# Patient Record
Sex: MALE | Race: WHITE | NOT HISPANIC OR LATINO | Employment: FULL TIME | URBAN - METROPOLITAN AREA
[De-identification: names, ages, dates, MRNs, and addresses within clinical notes are randomized per-mention and may not be internally consistent; named-entity substitution may affect disease eponyms.]

---

## 2021-03-02 ENCOUNTER — IMMUNIZATION (OUTPATIENT)
Dept: VACCINE CLINIC | Facility: HOSPITAL | Age: 61
End: 2021-03-02

## 2021-03-02 PROCEDURE — 91300 HC SARSCOV02 VAC 30MCG/0.3ML IM: CPT | Performed by: INTERNAL MEDICINE

## 2021-03-02 PROCEDURE — 0001A: CPT | Performed by: INTERNAL MEDICINE

## 2021-03-23 ENCOUNTER — IMMUNIZATION (OUTPATIENT)
Dept: VACCINE CLINIC | Facility: HOSPITAL | Age: 61
End: 2021-03-23

## 2021-03-23 PROCEDURE — 0002A: CPT | Performed by: INTERNAL MEDICINE

## 2021-03-23 PROCEDURE — 91300 HC SARSCOV02 VAC 30MCG/0.3ML IM: CPT | Performed by: INTERNAL MEDICINE

## 2021-10-22 RX ORDER — IBUPROFEN 200 MG
200 TABLET ORAL EVERY 6 HOURS PRN
Status: ON HOLD | COMMUNITY
End: 2021-10-29 | Stop reason: HOSPADM

## 2021-10-22 RX ORDER — BIOTIN 5 MG
TABLET ORAL
COMMUNITY

## 2021-10-22 RX ORDER — ATENOLOL AND CHLORTHALIDONE TABLET 100; 25 MG/1; MG/1
1 TABLET ORAL DAILY
COMMUNITY

## 2021-10-22 NOTE — PROGRESS NOTES
Place patient label inside box (if no patient label, complete below)  Name:  :  MR#:    High / PROCEDURE  1. I (we), Jason Benson (Patient Name) authorize Ralph Villareal (Provider / Jeanette Leigh) and/or such assistants as may be selected by him/her, to perform the following operation/procedure(s): RIGHT: SUBTALAR JOINT ARTHRODESIS, TALONAVICULAR JOINT ARTHRODESIS, POSSIBLE TRIPLE ARTHRODESIS, GASTROCNEMIUS RECESSION       Note: If unable to obtain consent prior to an emergent procedure, document the emergent reason in the medical record. This procedure has been explained to my (our) satisfaction and included in the explanation was:  A) The intended benefit, nature, and extent of the procedure to be performed;  B) The significant risks involved and the probability of success;  C) Alternative procedures and methods of treatment;  D) The dangers and probable consequences of such alternatives (including no procedure or treatment); E) The expected consequences of the procedure on my future health;  F) Whether other qualified individuals would be performing important surgical tasks and/or whether  would be present to advise or support the procedure. I (we) understand that there are other risks of infection and other serious complications in the pre-operative/procedural and postoperative/procedural stages of my (our) care. I (we) have asked all of the questions which I (we) thought were important in deciding whether or not to undergo treatment or diagnosis. These questions have been answered to my (our) satisfaction. I (we) understand that no assurance can be given that the procedure will be a success, and no guarantee or warranty of success has been given to me (us).     2. It has been explained to me (us) that during the course of the operation/procedure, unforeseen conditions may be revealed that necessitate extension of the original procedure(s) or different procedure(s) than those set forth in Paragraph 1. I (we) authorize and request that the above-named physician, his/her assistants or his/her designees, perform procedures as necessary and desirable if deemed to be in my (our) best interest.     Revised 8/2/2021                                                                          Page 1 of 2             3. I acknowledge that health care personnel may be observing this procedure for the purpose of medical education or other specified purposes as may be necessary as requested and/or approved by my (our) physician. 4. I (we) consent to the disposal by the hospital Pathologist of the removed tissue, parts or organs in accordance with hospital policy. 5. I do ____ do not ____ consent to the use of a local infiltration pain blocking agent that will be used by my provider/surgical provider to help alleviate pain during my procedure. 6. I do ____ do not ____ consent to an emergent blood transfusion in the case of a life-threatening situation that requires blood components to be administered. This consent is valid for 24 hours from the beginning of the procedure. 7. This patient does ____ or does not ____ currently have a DNR status/order. If DNR order is in place, obtain Addendum to the Surgical Consent for ALL Patients with a DNR Order to address josseline-operative status for limited intervention or DNR suspension.    8. I have read and fully understand the above Consent for Operation/Procedure and that all blanks were completed before I signed the consent.   _____________________________       _____________________      ____/____am/pm  Signature of Patient or legal representative      Printed Name / Relationship            Date / Time   ____________________________       _____________________      ____/____am/pm  Witness to Signature                                    Printed Name                    Date / Time     If patient is unable to sign or is a minor, complete the following)  Patient is a minor, ____ years of age, or unable to sign because:   ______________________________________________________________________________________________    Kristy Alfonso If a phone consent is obtained, consent will be documented by using two health care professionals, each affirming that the consenting party has no questions and gives consent for the procedure discussed with the physician/provider.   _____________________          ____________________       _____/_____am/pm   2nd witness to phone consent        Printed name           Date / Time    Informed Consent:  I have provided the explanation described above in section 1 to the patient and/or legal representative.  I have provided the patient and/or legal representative with an opportunity to ask any questions about the proposed operation/procedure.   ___________________________          ____________________         ____/____am/pm  Provider / Proceduralist                            Printed name            Date / Time  Revised 8/2/2021                                                                      Page 2 of 2

## 2021-10-22 NOTE — PROGRESS NOTES
[facility] patients having surgery or anesthesia are required to be Covid tested OR to have been vaccinated at least 14 days prior to your procedure. It is very important to return our call to [phone] and notify the staff of your last vaccination date otherwise you will be required to complete Covid PCR test within the 5-6 days prior to surgery & quarantine. The results will need to be faxed to PreAdmission Testing at [phone]. PRIOR TO PROCEDURE DATE:        1. PLEASE FOLLOW ANY  GUIDELINES/ INSTRUCTIONS PRIOR TO YOUR PROCEDURE AS ADVISED BY YOUR SURGEON. 2. Arrange for someone to drive you home and be with you for the first 24 hours after discharge for your safety after your procedure for which you received sedation. Ensure it is someone we can share information with regarding your discharge. 3. You must contact your surgeon for instructions IF:   You are taking any blood thinners, aspirin, anti-inflammatory or vitamin E.   There is a change in your physical condition such as a cold, fever, rash, cuts, sores or any other infection, especially near your surgical site. 4. Do not drink alcohol the day before or day of your procedure. 5. A Pre-op History and Physical for surgery MUST be completed by your Physician or Urgent Care within 30 days of your procedure date. Please bring a copy with you on the day of your procedure and along with any other testing performed. THE DAY OF YOUR PROCEDURE:  1. Follow instructions for ARRIVAL TIME as DIRECTED BY YOUR SURGEON. 2. Enter the MAIN entrance from [employer] and follow the signs to the free [employer] or [employer] parking (offered free of charge 6am-5pm). 3. Enter the Main Entrance of the hospital (do not enter from the lower level of the parking garage). Upon entrance, check in with the  at the main desk on your left. If no one is available at the desk, proceed into the [facility] Waiting Room and go through the door directly into the [facility]. There is a Check-in desk ACROSS from Room 5 (marked with a sign hanging from the ceiling). The phone number for the surgery center is [phone]. 4. Please call [phone] option #2 option #2 if you have not been preregistered yet. On the day of your procedure bring your insurance card and photo ID. You will be registered at your bedside once brought back to your room. 5. DO NOT EAT ANYTHING eight hours prior to your arrival for surgery. May have 8 ounces of water 4 hours prior to your arrival for surgery. NOTE: ALL Gastric, Bariatric and Bowel surgery patients MUST follow their surgeon's instructions. 6. MEDICATIONS    Take the following medications with a SMALL sip of water: NONE   Bariatric patient's call surgeon if on diabetic medications as some need to be stopped 1 week preop   Use your usual dose of inhalers the morning of surgery. BRING your rescue inhaler with you to hospital.    Anesthesia does NOT want you to take insulin the morning of surgery. They will control your blood sugar while you are at the hospital. Please contact your ordering physician for instructions regarding your insulin the night before your procedure. If you have an insulin pump, please keep it set on basal rate. 7. Do not swallow water when brushing teeth. No gum, candy, mints or ice chips. Refrain from smoking or at least decrease the amount. 8. Dress in loose, comfortable clothing appropriate for redressing after your procedure. Do not wear jewelry (including body piercings), make-up (especially NO eye make-up), fingernail polish (NO toenail polish if foot/leg surgery), lotion, powders or metal hairclips. 9. Dentures, glasses, or contacts will need to be removed before your procedure.  Bring cases for your glasses, contacts, dentures, or hearing aids to protect them while you are in surgery. 10. If you use a CPAP, please bring it with you on the day of your procedure. 11. We recommend that valuable personal  belongings such as cash, cell phones, e-tablets or jewelry, be left at home during your stay. The hospital will not be responsible for valuables that are not secured in the hospital safe. However, if your insurance requires a co-pay, you may want to bring a method of payment, i.e. Check or credit card, if you wish to pay your co-pay the day of surgery. 12. If you are to stay overnight, you may bring a bag with personal items. Please have any large items you may need brought in by your family after your arrival to your hospital room. 15. If you have a Living Will or Durable Power of , please bring a copy on the day of your procedure. 15. With your permission, one family member may accompany you while you are being prepared for surgery. Once you are ready, additional family members may join you. HOW WE KEEP YOU SAFE and WORK TO PREVENT SURGICAL SITE INFECTIONS:  1. Health care workers should always check your ID bracelet to verify your name and birth date. You will be asked many times to state your name, date of birth, and allergies. 2. Health care workers should always clean their hands with soap or alcohol gel before providing care to you. It is okay to ask anyone if they cleaned their hands before they touch you. 3. You will be actively involved in verifying the type of procedure you are having and ensuring the correct surgical site. This will be confirmed multiple times prior to your procedure. Do NOT yoon your surgery site UNLESS instructed to by your surgeon. 4. Do not shave or wax for 72 hours prior to procedure near your operative site. Shaving with a razor can irritate your skin and make it easier to develop an infection.  On the day of your procedure, any hair that needs to be removed near the surgical site will be clipped by a healthcare worker using a special clippers designed to avoid skin irritation. 5. When you are in the operating room, your surgical site will be cleansed with a special soap, and in most cases, you will be given an antibiotic before the surgery begins. What to expect AFTER YOUR PROCEDURE:  1. Immediately following your procedure, your will be taken to the PACU for the first phase of your recovery. Your nurse will help you recover from any potential side effects of anesthesia, such as extreme drowsiness, changes in your vital signs or breathing patterns. Nausea, headache, muscle aches, or sore throat may also occur after anesthesia. Your nurse will help you manage these potential side effects. 2. For comfort and safety, arrange to have someone at home with you for the first 24 hours after discharge. 3. You and your family will be given written instructions about your diet, activity, dressing care, medications, and return visits. 4. Once at home, should issues with nausea, pain, or bleeding occur, or should you notice any signs of infection, you should call your surgeon. 5. Always clean your hands before and after caring for your wound. Do not let your family touch your surgery site without cleaning their hands. 6. Narcotic pain medications can cause significant constipation. You may want to add a stool softener to your postoperative medication schedule or speak to your surgeon on how best to manage this SIDE EFFECT. SPECIAL INSTRUCTIONS     Thank you for allowing us to care for you. We strive to exceed your expectations in the delivery of care and service provided to you and your family. If you need to contact the [place] [address] staff for any reason, please call us at [phone]    Instructions reviewed with patient during preadmission testing phone interview.   Romana Luna RN.10/22/2021 .11:46 AM      ADDITIONAL EDUCATIONAL INFORMATION REVIEWED PER PHONE WITH YOU AND/OR YOUR FAMILY:  No Hibiclens® Bathing Instructions   Yes Antibacterial Soap

## 2021-10-27 ENCOUNTER — ANESTHESIA EVENT (OUTPATIENT)
Dept: OPERATING ROOM | Age: 61
End: 2021-10-27
Payer: COMMERCIAL

## 2021-10-28 ENCOUNTER — HOSPITAL ENCOUNTER (OUTPATIENT)
Age: 61
Setting detail: OBSERVATION
Discharge: HOME | End: 2021-10-29
Attending: ORTHOPAEDIC SURGERY | Admitting: ORTHOPAEDIC SURGERY
Payer: COMMERCIAL

## 2021-10-28 ENCOUNTER — ANESTHESIA (OUTPATIENT)
Dept: OPERATING ROOM | Age: 61
End: 2021-10-28
Payer: COMMERCIAL

## 2021-10-28 VITALS — DIASTOLIC BLOOD PRESSURE: 58 MMHG | OXYGEN SATURATION: 94 % | TEMPERATURE: 97.7 F | SYSTOLIC BLOOD PRESSURE: 122 MMHG

## 2021-10-28 DIAGNOSIS — M19.071 ARTHRITIS OF RIGHT FOOT: Primary | ICD-10-CM

## 2021-10-28 LAB
GLUCOSE BLD-MCNC: 146 MG/DL (ref 70–99)
PERFORMED ON: ABNORMAL

## 2021-10-28 PROCEDURE — 3600000004 HC SURGERY LEVEL 4 BASE: Performed by: ORTHOPAEDIC SURGERY

## 2021-10-28 PROCEDURE — 6360000002 HC RX W HCPCS: Performed by: ANESTHESIOLOGY

## 2021-10-28 PROCEDURE — 6360000002 HC RX W HCPCS: Performed by: NURSE ANESTHETIST, CERTIFIED REGISTERED

## 2021-10-28 PROCEDURE — 6370000000 HC RX 637 (ALT 250 FOR IP): Performed by: PHYSICIAN ASSISTANT

## 2021-10-28 PROCEDURE — 6360000002 HC RX W HCPCS: Performed by: ORTHOPAEDIC SURGERY

## 2021-10-28 PROCEDURE — 3600000014 HC SURGERY LEVEL 4 ADDTL 15MIN: Performed by: ORTHOPAEDIC SURGERY

## 2021-10-28 PROCEDURE — 3700000000 HC ANESTHESIA ATTENDED CARE: Performed by: ORTHOPAEDIC SURGERY

## 2021-10-28 PROCEDURE — C1769 GUIDE WIRE: HCPCS | Performed by: ORTHOPAEDIC SURGERY

## 2021-10-28 PROCEDURE — 2580000003 HC RX 258: Performed by: PHYSICIAN ASSISTANT

## 2021-10-28 PROCEDURE — C1713 ANCHOR/SCREW BN/BN,TIS/BN: HCPCS | Performed by: ORTHOPAEDIC SURGERY

## 2021-10-28 PROCEDURE — 3700000001 HC ADD 15 MINUTES (ANESTHESIA): Performed by: ORTHOPAEDIC SURGERY

## 2021-10-28 PROCEDURE — 7100000001 HC PACU RECOVERY - ADDTL 15 MIN: Performed by: ORTHOPAEDIC SURGERY

## 2021-10-28 PROCEDURE — 2580000003 HC RX 258: Performed by: ORTHOPAEDIC SURGERY

## 2021-10-28 PROCEDURE — 64445 NJX AA&/STRD SCIATIC NRV IMG: CPT | Performed by: ANESTHESIOLOGY

## 2021-10-28 PROCEDURE — 2500000003 HC RX 250 WO HCPCS: Performed by: NURSE ANESTHETIST, CERTIFIED REGISTERED

## 2021-10-28 PROCEDURE — 2580000003 HC RX 258: Performed by: STUDENT IN AN ORGANIZED HEALTH CARE EDUCATION/TRAINING PROGRAM

## 2021-10-28 PROCEDURE — C1762 CONN TISS, HUMAN(INC FASCIA): HCPCS | Performed by: ORTHOPAEDIC SURGERY

## 2021-10-28 PROCEDURE — 2720000010 HC SURG SUPPLY STERILE: Performed by: ORTHOPAEDIC SURGERY

## 2021-10-28 PROCEDURE — G0378 HOSPITAL OBSERVATION PER HR: HCPCS

## 2021-10-28 PROCEDURE — 2709999900 HC NON-CHARGEABLE SUPPLY: Performed by: ORTHOPAEDIC SURGERY

## 2021-10-28 PROCEDURE — 64447 NJX AA&/STRD FEMORAL NRV IMG: CPT | Performed by: ANESTHESIOLOGY

## 2021-10-28 PROCEDURE — 2500000003 HC RX 250 WO HCPCS: Performed by: ORTHOPAEDIC SURGERY

## 2021-10-28 PROCEDURE — 7100000000 HC PACU RECOVERY - FIRST 15 MIN: Performed by: ORTHOPAEDIC SURGERY

## 2021-10-28 PROCEDURE — 2500000003 HC RX 250 WO HCPCS: Performed by: ANESTHESIOLOGY

## 2021-10-28 PROCEDURE — 2500000003 HC RX 250 WO HCPCS: Performed by: PHYSICIAN ASSISTANT

## 2021-10-28 DEVICE — LAG SCREW (CANNULATED HEADED) 4.5 X 36 MM
Type: IMPLANTABLE DEVICE | Site: ANKLE | Status: FUNCTIONAL
Brand: IO FIX

## 2021-10-28 DEVICE — FIXATION BEAM, 6.5 X 100 MM
Type: IMPLANTABLE DEVICE | Site: ANKLE | Status: FUNCTIONAL
Brand: AXIS

## 2021-10-28 DEVICE — FIXATION BEAM, 6.5 X 95 MM
Type: IMPLANTABLE DEVICE | Site: ANKLE | Status: FUNCTIONAL
Brand: AXIS

## 2021-10-28 DEVICE — KIT STPL W20XL20MM BNE FIX BRDG 2 LEG CONT COMPR BME ELITE: Type: IMPLANTABLE DEVICE | Site: ANKLE | Status: FUNCTIONAL

## 2021-10-28 DEVICE — BONE GRAFT KIT 7510050 INFUSE XX SMALL
Type: IMPLANTABLE DEVICE | Site: ANKLE | Status: FUNCTIONAL
Brand: INFUSE® BONE GRAFT

## 2021-10-28 DEVICE — LAG SCREW (CANNULATED TAPERED) - 5.0 X 40 MM
Type: IMPLANTABLE DEVICE | Site: ANKLE | Status: FUNCTIONAL
Brand: IO FIX

## 2021-10-28 DEVICE — GRAFT BNE SUB W11-20X14-27XL14-20MM THK24-26MM IL CREST: Type: IMPLANTABLE DEVICE | Site: ANKLE | Status: FUNCTIONAL

## 2021-10-28 DEVICE — SMALL X-POST - 30MM
Type: IMPLANTABLE DEVICE | Site: ANKLE | Status: FUNCTIONAL
Brand: IO FIX

## 2021-10-28 RX ORDER — MORPHINE SULFATE 2 MG/ML
4 INJECTION, SOLUTION INTRAMUSCULAR; INTRAVENOUS
Status: DISCONTINUED | OUTPATIENT
Start: 2021-10-28 | End: 2021-10-29 | Stop reason: HOSPADM

## 2021-10-28 RX ORDER — FENTANYL CITRATE 50 UG/ML
25 INJECTION, SOLUTION INTRAMUSCULAR; INTRAVENOUS EVERY 5 MIN PRN
Status: DISCONTINUED | OUTPATIENT
Start: 2021-10-28 | End: 2021-10-28 | Stop reason: HOSPADM

## 2021-10-28 RX ORDER — SODIUM CHLORIDE, SODIUM LACTATE, POTASSIUM CHLORIDE, CALCIUM CHLORIDE 600; 310; 30; 20 MG/100ML; MG/100ML; MG/100ML; MG/100ML
INJECTION, SOLUTION INTRAVENOUS CONTINUOUS
Status: DISCONTINUED | OUTPATIENT
Start: 2021-10-28 | End: 2021-10-29 | Stop reason: HOSPADM

## 2021-10-28 RX ORDER — SODIUM CHLORIDE 9 MG/ML
25 INJECTION, SOLUTION INTRAVENOUS PRN
Status: DISCONTINUED | OUTPATIENT
Start: 2021-10-28 | End: 2021-10-28 | Stop reason: HOSPADM

## 2021-10-28 RX ORDER — PROCHLORPERAZINE EDISYLATE 5 MG/ML
5 INJECTION INTRAMUSCULAR; INTRAVENOUS
Status: DISCONTINUED | OUTPATIENT
Start: 2021-10-28 | End: 2021-10-28 | Stop reason: HOSPADM

## 2021-10-28 RX ORDER — SUCCINYLCHOLINE/SOD CL,ISO/PF 200MG/10ML
SYRINGE (ML) INTRAVENOUS PRN
Status: DISCONTINUED | OUTPATIENT
Start: 2021-10-28 | End: 2021-10-28 | Stop reason: SDUPTHER

## 2021-10-28 RX ORDER — ATENOLOL 50 MG/1
100 TABLET ORAL DAILY
Status: DISCONTINUED | OUTPATIENT
Start: 2021-10-29 | End: 2021-10-29 | Stop reason: HOSPADM

## 2021-10-28 RX ORDER — HYDROCODONE BITARTRATE AND ACETAMINOPHEN 5; 325 MG/1; MG/1
1 TABLET ORAL EVERY 4 HOURS PRN
Status: DISCONTINUED | OUTPATIENT
Start: 2021-10-28 | End: 2021-10-29 | Stop reason: HOSPADM

## 2021-10-28 RX ORDER — DULOXETIN HYDROCHLORIDE 30 MG/1
30 CAPSULE, DELAYED RELEASE ORAL DAILY
Status: DISCONTINUED | OUTPATIENT
Start: 2021-10-29 | End: 2021-10-29 | Stop reason: HOSPADM

## 2021-10-28 RX ORDER — HEPARIN SODIUM 5000 [USP'U]/ML
5000 INJECTION, SOLUTION INTRAVENOUS; SUBCUTANEOUS EVERY 8 HOURS SCHEDULED
Status: DISCONTINUED | OUTPATIENT
Start: 2021-10-29 | End: 2021-10-29 | Stop reason: HOSPADM

## 2021-10-28 RX ORDER — DIPHENHYDRAMINE HYDROCHLORIDE 50 MG/ML
12.5 INJECTION INTRAMUSCULAR; INTRAVENOUS
Status: DISCONTINUED | OUTPATIENT
Start: 2021-10-28 | End: 2021-10-28 | Stop reason: HOSPADM

## 2021-10-28 RX ORDER — FENTANYL CITRATE 50 UG/ML
100 INJECTION, SOLUTION INTRAMUSCULAR; INTRAVENOUS ONCE
Status: COMPLETED | OUTPATIENT
Start: 2021-10-28 | End: 2021-10-28

## 2021-10-28 RX ORDER — LIDOCAINE HYDROCHLORIDE 10 MG/ML
1 INJECTION, SOLUTION EPIDURAL; INFILTRATION; INTRACAUDAL; PERINEURAL
Status: DISCONTINUED | OUTPATIENT
Start: 2021-10-28 | End: 2021-10-28 | Stop reason: HOSPADM

## 2021-10-28 RX ORDER — SODIUM CHLORIDE 0.9 % (FLUSH) 0.9 %
10 SYRINGE (ML) INJECTION EVERY 12 HOURS SCHEDULED
Status: DISCONTINUED | OUTPATIENT
Start: 2021-10-28 | End: 2021-10-28 | Stop reason: HOSPADM

## 2021-10-28 RX ORDER — HYDROCODONE BITARTRATE AND ACETAMINOPHEN 5; 325 MG/1; MG/1
2 TABLET ORAL EVERY 4 HOURS PRN
Status: DISCONTINUED | OUTPATIENT
Start: 2021-10-28 | End: 2021-10-29 | Stop reason: HOSPADM

## 2021-10-28 RX ORDER — DULOXETIN HYDROCHLORIDE 30 MG/1
30 CAPSULE, DELAYED RELEASE ORAL DAILY
COMMUNITY
Start: 2021-10-18

## 2021-10-28 RX ORDER — SODIUM CHLORIDE 9 MG/ML
25 INJECTION, SOLUTION INTRAVENOUS PRN
Status: DISCONTINUED | OUTPATIENT
Start: 2021-10-28 | End: 2021-10-29 | Stop reason: HOSPADM

## 2021-10-28 RX ORDER — MORPHINE SULFATE 2 MG/ML
2 INJECTION, SOLUTION INTRAMUSCULAR; INTRAVENOUS
Status: DISCONTINUED | OUTPATIENT
Start: 2021-10-28 | End: 2021-10-29 | Stop reason: HOSPADM

## 2021-10-28 RX ORDER — PROPOFOL 10 MG/ML
INJECTION, EMULSION INTRAVENOUS CONTINUOUS PRN
Status: DISCONTINUED | OUTPATIENT
Start: 2021-10-28 | End: 2021-10-28 | Stop reason: SDUPTHER

## 2021-10-28 RX ORDER — SODIUM CHLORIDE 0.9 % (FLUSH) 0.9 %
5-40 SYRINGE (ML) INJECTION PRN
Status: DISCONTINUED | OUTPATIENT
Start: 2021-10-28 | End: 2021-10-29 | Stop reason: HOSPADM

## 2021-10-28 RX ORDER — MEPERIDINE HYDROCHLORIDE 25 MG/ML
12.5 INJECTION INTRAMUSCULAR; INTRAVENOUS; SUBCUTANEOUS EVERY 5 MIN PRN
Status: DISCONTINUED | OUTPATIENT
Start: 2021-10-28 | End: 2021-10-28 | Stop reason: HOSPADM

## 2021-10-28 RX ORDER — SODIUM CHLORIDE 0.9 % (FLUSH) 0.9 %
5-40 SYRINGE (ML) INJECTION EVERY 12 HOURS SCHEDULED
Status: DISCONTINUED | OUTPATIENT
Start: 2021-10-28 | End: 2021-10-29 | Stop reason: HOSPADM

## 2021-10-28 RX ORDER — CHLORTHALIDONE 25 MG/1
25 TABLET ORAL DAILY
Status: DISCONTINUED | OUTPATIENT
Start: 2021-10-29 | End: 2021-10-29 | Stop reason: HOSPADM

## 2021-10-28 RX ORDER — LABETALOL HYDROCHLORIDE 5 MG/ML
5 INJECTION, SOLUTION INTRAVENOUS EVERY 10 MIN PRN
Status: DISCONTINUED | OUTPATIENT
Start: 2021-10-28 | End: 2021-10-28 | Stop reason: HOSPADM

## 2021-10-28 RX ORDER — LIDOCAINE HCL/PF 100 MG/5ML
SYRINGE (ML) INJECTION PRN
Status: DISCONTINUED | OUTPATIENT
Start: 2021-10-28 | End: 2021-10-28 | Stop reason: SDUPTHER

## 2021-10-28 RX ORDER — BUPIVACAINE HYDROCHLORIDE 5 MG/ML
INJECTION, SOLUTION EPIDURAL; INTRACAUDAL
Status: COMPLETED | OUTPATIENT
Start: 2021-10-28 | End: 2021-10-28

## 2021-10-28 RX ORDER — DEXAMETHASONE SODIUM PHOSPHATE 4 MG/ML
INJECTION, SOLUTION INTRA-ARTICULAR; INTRALESIONAL; INTRAMUSCULAR; INTRAVENOUS; SOFT TISSUE
Status: DISPENSED
Start: 2021-10-28 | End: 2021-10-28

## 2021-10-28 RX ORDER — CLINDAMYCIN PHOSPHATE 900 MG/50ML
900 INJECTION INTRAVENOUS ONCE
Status: COMPLETED | OUTPATIENT
Start: 2021-10-28 | End: 2021-10-28

## 2021-10-28 RX ORDER — BUPIVACAINE HYDROCHLORIDE 5 MG/ML
30 INJECTION, SOLUTION EPIDURAL; INTRACAUDAL ONCE
Status: DISCONTINUED | OUTPATIENT
Start: 2021-10-28 | End: 2021-10-29 | Stop reason: HOSPADM

## 2021-10-28 RX ORDER — ONDANSETRON 2 MG/ML
4 INJECTION INTRAMUSCULAR; INTRAVENOUS EVERY 6 HOURS PRN
Status: DISCONTINUED | OUTPATIENT
Start: 2021-10-28 | End: 2021-10-29 | Stop reason: HOSPADM

## 2021-10-28 RX ORDER — ONDANSETRON 4 MG/1
4 TABLET, ORALLY DISINTEGRATING ORAL EVERY 8 HOURS PRN
Status: DISCONTINUED | OUTPATIENT
Start: 2021-10-28 | End: 2021-10-29 | Stop reason: HOSPADM

## 2021-10-28 RX ORDER — SODIUM CHLORIDE 0.9 % (FLUSH) 0.9 %
10 SYRINGE (ML) INJECTION PRN
Status: DISCONTINUED | OUTPATIENT
Start: 2021-10-28 | End: 2021-10-28 | Stop reason: HOSPADM

## 2021-10-28 RX ORDER — ONDANSETRON 2 MG/ML
4 INJECTION INTRAMUSCULAR; INTRAVENOUS
Status: DISCONTINUED | OUTPATIENT
Start: 2021-10-28 | End: 2021-10-28 | Stop reason: HOSPADM

## 2021-10-28 RX ORDER — CLINDAMYCIN PHOSPHATE 900 MG/50ML
900 INJECTION INTRAVENOUS EVERY 8 HOURS
Status: COMPLETED | OUTPATIENT
Start: 2021-10-28 | End: 2021-10-29

## 2021-10-28 RX ORDER — HYDRALAZINE HYDROCHLORIDE 20 MG/ML
5 INJECTION INTRAMUSCULAR; INTRAVENOUS EVERY 10 MIN PRN
Status: DISCONTINUED | OUTPATIENT
Start: 2021-10-28 | End: 2021-10-28 | Stop reason: HOSPADM

## 2021-10-28 RX ORDER — PROPOFOL 10 MG/ML
INJECTION, EMULSION INTRAVENOUS PRN
Status: DISCONTINUED | OUTPATIENT
Start: 2021-10-28 | End: 2021-10-28 | Stop reason: SDUPTHER

## 2021-10-28 RX ORDER — MIDAZOLAM HYDROCHLORIDE 1 MG/ML
2 INJECTION INTRAMUSCULAR; INTRAVENOUS ONCE
Status: COMPLETED | OUTPATIENT
Start: 2021-10-28 | End: 2021-10-28

## 2021-10-28 RX ORDER — ATENOLOL AND CHLORTHALIDONE TABLET 100; 25 MG/1; MG/1
1 TABLET ORAL DAILY
Status: DISCONTINUED | OUTPATIENT
Start: 2021-10-28 | End: 2021-10-28 | Stop reason: SDUPTHER

## 2021-10-28 RX ORDER — FENTANYL CITRATE 50 UG/ML
50 INJECTION, SOLUTION INTRAMUSCULAR; INTRAVENOUS EVERY 5 MIN PRN
Status: DISCONTINUED | OUTPATIENT
Start: 2021-10-28 | End: 2021-10-28 | Stop reason: HOSPADM

## 2021-10-28 RX ORDER — OXYCODONE HYDROCHLORIDE AND ACETAMINOPHEN 5; 325 MG/1; MG/1
1 TABLET ORAL PRN
Status: DISCONTINUED | OUTPATIENT
Start: 2021-10-28 | End: 2021-10-28 | Stop reason: HOSPADM

## 2021-10-28 RX ORDER — OXYCODONE HYDROCHLORIDE AND ACETAMINOPHEN 5; 325 MG/1; MG/1
2 TABLET ORAL PRN
Status: DISCONTINUED | OUTPATIENT
Start: 2021-10-28 | End: 2021-10-28 | Stop reason: HOSPADM

## 2021-10-28 RX ADMIN — CLINDAMYCIN PHOSPHATE 900 MG: 900 INJECTION, SOLUTION INTRAVENOUS at 22:54

## 2021-10-28 RX ADMIN — HYDROCODONE BITARTRATE AND ACETAMINOPHEN 2 TABLET: 5; 325 TABLET ORAL at 22:53

## 2021-10-28 RX ADMIN — Medication 100 MG: at 11:07

## 2021-10-28 RX ADMIN — FENTANYL CITRATE 100 MCG: 50 INJECTION INTRAMUSCULAR; INTRAVENOUS at 09:35

## 2021-10-28 RX ADMIN — PROPOFOL 75 MCG/KG/MIN: 10 INJECTION, EMULSION INTRAVENOUS at 12:41

## 2021-10-28 RX ADMIN — BUPIVACAINE HYDROCHLORIDE 20 ML: 5 INJECTION, SOLUTION EPIDURAL; INTRACAUDAL at 09:46

## 2021-10-28 RX ADMIN — CLINDAMYCIN PHOSPHATE 900 MG: 900 INJECTION, SOLUTION INTRAVENOUS at 11:05

## 2021-10-28 RX ADMIN — MIDAZOLAM HYDROCHLORIDE 2 MG: 2 INJECTION, SOLUTION INTRAMUSCULAR; INTRAVENOUS at 09:35

## 2021-10-28 RX ADMIN — BUPIVACAINE HYDROCHLORIDE 15 ML: 5 INJECTION, SOLUTION EPIDURAL; INTRACAUDAL; PERINEURAL at 09:45

## 2021-10-28 RX ADMIN — PROPOFOL 350 MG: 10 INJECTION, EMULSION INTRAVENOUS at 11:07

## 2021-10-28 RX ADMIN — Medication 5 ML: at 22:54

## 2021-10-28 RX ADMIN — FENTANYL CITRATE 25 MCG: 50 INJECTION, SOLUTION INTRAMUSCULAR; INTRAVENOUS at 16:38

## 2021-10-28 RX ADMIN — FENTANYL CITRATE 25 MCG: 50 INJECTION, SOLUTION INTRAMUSCULAR; INTRAVENOUS at 16:47

## 2021-10-28 RX ADMIN — SODIUM CHLORIDE, POTASSIUM CHLORIDE, SODIUM LACTATE AND CALCIUM CHLORIDE: 600; 310; 30; 20 INJECTION, SOLUTION INTRAVENOUS at 09:57

## 2021-10-28 RX ADMIN — Medication 160 MG: at 11:07

## 2021-10-28 RX ADMIN — SODIUM CHLORIDE, POTASSIUM CHLORIDE, SODIUM LACTATE AND CALCIUM CHLORIDE: 600; 310; 30; 20 INJECTION, SOLUTION INTRAVENOUS at 12:29

## 2021-10-28 ASSESSMENT — PAIN - FUNCTIONAL ASSESSMENT
PAIN_FUNCTIONAL_ASSESSMENT: FACES
PAIN_FUNCTIONAL_ASSESSMENT: 0-10
PAIN_FUNCTIONAL_ASSESSMENT: 0-10
PAIN_FUNCTIONAL_ASSESSMENT: FACES
PAIN_FUNCTIONAL_ASSESSMENT: FACES

## 2021-10-28 ASSESSMENT — PULMONARY FUNCTION TESTS
PIF_VALUE: 22
PIF_VALUE: 21
PIF_VALUE: 24
PIF_VALUE: 22
PIF_VALUE: 4
PIF_VALUE: 22
PIF_VALUE: 15
PIF_VALUE: 2
PIF_VALUE: 21
PIF_VALUE: 21
PIF_VALUE: 22
PIF_VALUE: 22
PIF_VALUE: 17
PIF_VALUE: 1
PIF_VALUE: 0
PIF_VALUE: 21
PIF_VALUE: 21
PIF_VALUE: 22
PIF_VALUE: 23
PIF_VALUE: 1
PIF_VALUE: 8
PIF_VALUE: 0
PIF_VALUE: 22
PIF_VALUE: 4
PIF_VALUE: 22
PIF_VALUE: 17
PIF_VALUE: 24
PIF_VALUE: 21
PIF_VALUE: 22
PIF_VALUE: 17
PIF_VALUE: 21
PIF_VALUE: 18
PIF_VALUE: 21
PIF_VALUE: 18
PIF_VALUE: 21
PIF_VALUE: 19
PIF_VALUE: 22
PIF_VALUE: 24
PIF_VALUE: 19
PIF_VALUE: 17
PIF_VALUE: 21
PIF_VALUE: 16
PIF_VALUE: 21
PIF_VALUE: 18
PIF_VALUE: 24
PIF_VALUE: 21
PIF_VALUE: 19
PIF_VALUE: 24
PIF_VALUE: 22
PIF_VALUE: 20
PIF_VALUE: 18
PIF_VALUE: 18
PIF_VALUE: 16
PIF_VALUE: 22
PIF_VALUE: 16
PIF_VALUE: 22
PIF_VALUE: 22
PIF_VALUE: 24
PIF_VALUE: 17
PIF_VALUE: 22
PIF_VALUE: 22
PIF_VALUE: 16
PIF_VALUE: 16
PIF_VALUE: 22
PIF_VALUE: 21
PIF_VALUE: 4
PIF_VALUE: 21
PIF_VALUE: 17
PIF_VALUE: 0
PIF_VALUE: 22
PIF_VALUE: 21
PIF_VALUE: 16
PIF_VALUE: 22
PIF_VALUE: 21
PIF_VALUE: 24
PIF_VALUE: 24
PIF_VALUE: 22
PIF_VALUE: 22
PIF_VALUE: 0
PIF_VALUE: 22
PIF_VALUE: 21
PIF_VALUE: 3
PIF_VALUE: 22
PIF_VALUE: 17
PIF_VALUE: 22
PIF_VALUE: 21
PIF_VALUE: 22
PIF_VALUE: 21
PIF_VALUE: 22
PIF_VALUE: 21
PIF_VALUE: 22
PIF_VALUE: 0
PIF_VALUE: 19
PIF_VALUE: 18
PIF_VALUE: 21
PIF_VALUE: 21
PIF_VALUE: 19
PIF_VALUE: 21
PIF_VALUE: 22
PIF_VALUE: 22
PIF_VALUE: 19
PIF_VALUE: 18
PIF_VALUE: 21
PIF_VALUE: 19
PIF_VALUE: 22
PIF_VALUE: 21
PIF_VALUE: 24
PIF_VALUE: 23
PIF_VALUE: 19
PIF_VALUE: 21
PIF_VALUE: 16
PIF_VALUE: 17
PIF_VALUE: 24
PIF_VALUE: 20
PIF_VALUE: 22
PIF_VALUE: 22
PIF_VALUE: 21
PIF_VALUE: 22
PIF_VALUE: 1
PIF_VALUE: 22
PIF_VALUE: 22
PIF_VALUE: 15
PIF_VALUE: 21
PIF_VALUE: 21
PIF_VALUE: 22
PIF_VALUE: 22
PIF_VALUE: 24
PIF_VALUE: 22
PIF_VALUE: 2
PIF_VALUE: 21
PIF_VALUE: 22
PIF_VALUE: 21
PIF_VALUE: 22
PIF_VALUE: 21
PIF_VALUE: 2
PIF_VALUE: 24
PIF_VALUE: 21
PIF_VALUE: 22
PIF_VALUE: 24
PIF_VALUE: 22
PIF_VALUE: 21
PIF_VALUE: 21
PIF_VALUE: 22
PIF_VALUE: 18
PIF_VALUE: 22
PIF_VALUE: 3
PIF_VALUE: 22
PIF_VALUE: 21
PIF_VALUE: 22
PIF_VALUE: 24
PIF_VALUE: 22
PIF_VALUE: 22
PIF_VALUE: 24
PIF_VALUE: 21
PIF_VALUE: 22
PIF_VALUE: 21
PIF_VALUE: 22
PIF_VALUE: 4
PIF_VALUE: 21
PIF_VALUE: 21
PIF_VALUE: 22
PIF_VALUE: 21
PIF_VALUE: 21
PIF_VALUE: 22
PIF_VALUE: 5
PIF_VALUE: 16
PIF_VALUE: 22
PIF_VALUE: 16
PIF_VALUE: 21
PIF_VALUE: 21
PIF_VALUE: 22
PIF_VALUE: 22
PIF_VALUE: 17
PIF_VALUE: 21
PIF_VALUE: 4
PIF_VALUE: 19
PIF_VALUE: 24
PIF_VALUE: 21
PIF_VALUE: 22
PIF_VALUE: 22
PIF_VALUE: 18
PIF_VALUE: 22
PIF_VALUE: 21
PIF_VALUE: 18
PIF_VALUE: 21
PIF_VALUE: 22
PIF_VALUE: 21
PIF_VALUE: 21
PIF_VALUE: 17
PIF_VALUE: 22
PIF_VALUE: 22
PIF_VALUE: 21
PIF_VALUE: 22
PIF_VALUE: 2
PIF_VALUE: 22
PIF_VALUE: 21
PIF_VALUE: 21
PIF_VALUE: 22
PIF_VALUE: 22
PIF_VALUE: 21
PIF_VALUE: 24
PIF_VALUE: 18
PIF_VALUE: 22
PIF_VALUE: 18
PIF_VALUE: 22
PIF_VALUE: 17
PIF_VALUE: 24
PIF_VALUE: 21

## 2021-10-28 ASSESSMENT — PAIN SCALES - GENERAL
PAINLEVEL_OUTOF10: 4
PAINLEVEL_OUTOF10: 7
PAINLEVEL_OUTOF10: 4
PAINLEVEL_OUTOF10: 0
PAINLEVEL_OUTOF10: 0
PAINLEVEL_OUTOF10: 2

## 2021-10-28 ASSESSMENT — PAIN DESCRIPTION - FREQUENCY
FREQUENCY: CONTINUOUS
FREQUENCY: CONTINUOUS

## 2021-10-28 ASSESSMENT — PAIN DESCRIPTION - DESCRIPTORS
DESCRIPTORS: ACHING
DESCRIPTORS: DULL

## 2021-10-28 ASSESSMENT — PAIN DESCRIPTION - PROGRESSION
CLINICAL_PROGRESSION: NOT CHANGED
CLINICAL_PROGRESSION: GRADUALLY WORSENING

## 2021-10-28 ASSESSMENT — PAIN DESCRIPTION - ONSET
ONSET: GRADUAL
ONSET: ON-GOING

## 2021-10-28 ASSESSMENT — PAIN DESCRIPTION - ORIENTATION
ORIENTATION: RIGHT
ORIENTATION: RIGHT

## 2021-10-28 ASSESSMENT — PAIN DESCRIPTION - PAIN TYPE
TYPE: SURGICAL PAIN
TYPE: SURGICAL PAIN

## 2021-10-28 ASSESSMENT — PAIN DESCRIPTION - LOCATION
LOCATION: ANKLE
LOCATION: ANKLE

## 2021-10-28 ASSESSMENT — ENCOUNTER SYMPTOMS: SHORTNESS OF BREATH: 0

## 2021-10-28 ASSESSMENT — LIFESTYLE VARIABLES: SMOKING_STATUS: 0

## 2021-10-28 NOTE — ANESTHESIA PROCEDURE NOTES
Peripheral Block    Patient location during procedure: pre-op  Start time: 10/28/2021 9:35 AM  End time: 10/28/2021 9:37 AM  Staffing  Performed: anesthesiologist   Anesthesiologist: Andee Gosselin, MD  Preanesthetic Checklist  Completed: patient identified, IV checked, site marked, risks and benefits discussed, surgical consent, monitors and equipment checked, pre-op evaluation, timeout performed, anesthesia consent given, oxygen available and patient being monitored  Peripheral Block  Patient position: supine  Prep: ChloraPrep  Patient monitoring: cardiac monitor, continuous pulse ox, frequent blood pressure checks and IV access  Block type: Sciatic  Laterality: right  Injection technique: single-shot  Guidance: ultrasound guided  Local infiltration: decadron  Infiltration strength: 1 %  Dose: 3 mL  Popliteal  Provider prep: mask and sterile gloves  Local infiltration: decadron  Needle  Needle type: combined needle/nerve stimulator   Needle gauge: 21 G  Needle length: 10 cm  Needle localization: ultrasound guidance  Assessment  Injection assessment: negative aspiration for heme, no paresthesia on injection and local visualized surrounding nerve on ultrasound  Paresthesia pain: none  Slow fractionated injection: yes  Hemodynamics: stable  Additional Notes  Immediately prior to procedure a \"time out\" was called to verify the correct patient, allergies, laterality, procedure and equipment. Time out performed with rosie CARPIO    Local Anesthetic: 0.5 %  Bupivacaine   Amount: 25 ml  in 5 ml increments after negative aspiration each time. Biceps Femoris muscle (long head), Vastus lateralis muscle, Sciatic nerve (Tibia and Common Peroneal Nerves) and Popliteal artery are identified; the tip of the needle and the spread of the local anesthetic around the Tibial and Common Peroneal Nerve are visualized.  The Sciatic Nerve (Tibia and Common Peroneal Nerve) appeared to be anatomically normal and there were no abnormal pathologically findings seen.          Medications Administered  Bupivacaine (MARCAINE) PF injection 0.5%, 20 mL  Reason for block: post-op pain management and at surgeon's request

## 2021-10-28 NOTE — ANESTHESIA PROCEDURE NOTES
Peripheral Block    Patient location during procedure: pre-op  Start time: 10/28/2021 9:31 AM  End time: 10/28/2021 9:33 AM  Staffing  Performed: anesthesiologist   Anesthesiologist: Haven Dorantes MD  Preanesthetic Checklist  Completed: patient identified, IV checked, site marked, risks and benefits discussed, surgical consent, monitors and equipment checked, pre-op evaluation, timeout performed, anesthesia consent given, oxygen available and patient being monitored  Peripheral Block  Patient position: supine  Prep: ChloraPrep  Patient monitoring: cardiac monitor, continuous pulse ox, frequent blood pressure checks and IV access  Block type: Femoral  Laterality: right  Injection technique: single-shot  Guidance: ultrasound guided  Local infiltration: decadron  Infiltration strength: 1 %  Dose: 3 mL  Adductor canal (Low Femoral)  Provider prep: mask and sterile gloves  Local infiltration: decadron  Needle  Needle type: combined needle/nerve stimulator   Needle gauge: 21 G  Needle length: 10 cm  Needle localization: ultrasound guidance  Assessment  Injection assessment: negative aspiration for heme, no paresthesia on injection and local visualized surrounding nerve on ultrasound  Paresthesia pain: none  Slow fractionated injection: yes  Hemodynamics: stable  Additional Notes  Immediately prior to procedure a \"time out\" was called to verify the correct patient, allergies, laterality, procedure and equipment. Time out performed with rosie CARPIO    Local Anesthetic: 0.5 %  Bupivacaine   Amount: 15 ml  in 5 ml increments after negative aspiration each time. Iliopsoas Muscle and Fascia Iliaca, Femoral artery (Deep artery to the thigh take off), Femoral Vein and Femoral Nerve are identified; the tip of the need and the spread of the local anesthetic around the Femoral nerve are visualized. The Femoral nerve appeared to be anatomically normal and there were no abnormal pathologically findings seen.          Medications Administered  Bupivacaine (MARCAINE) PF injection 0.5%, 15 mL  Reason for block: post-op pain management and at surgeon's request

## 2021-10-28 NOTE — PROGRESS NOTES
Pt arrived asleep with oral air way no SS of pain or distress report received from CRNA of R ankle fusion and plan to admit to hospital for observation RIGHT: SUBTALAR JOINT ARTHRODESIS, TALONAVICULAR JOINT ARTHRODESIS, , GASTROCNEMIUS RECESSION - Right

## 2021-10-28 NOTE — PROGRESS NOTES
4 Eyes Admission Assessment     I agree as the admission nurse that 2 RN's have performed a thorough Head to Toe Skin Assessment on the patient. ALL assessment sites listed below have been assessed on admission. Areas assessed by both nurses:  [x]   Head, Face, and Ears   [x]   Shoulders, Back, and Chest  [x]   Arms, Elbows, and Hands   [x]   Coccyx, Sacrum, and Ischium  [x]   Legs, Feet, and Heels ---SX TO R ANKLE        Does the Patient have Skin Breakdown?   No         Lamberto Prevention initiated:  No   Wound Care Orders initiated:  No      [facility] nurse consulted for Pressure Injury (Stage 3,4, Unstageable, DTI, NWPT, and Complex wounds) or Lamberto score 18 or lower:  No      Nurse 1 eSignature: Electronically signed by Jaylyn Joseph RN on 10/28/21 at 7:57 PM EDT    **SHARE this note so that the co-signing nurse is able to place an eSignature**    Nurse 2 eSignature: Electronically signed by [place] RN on 10/29/21 at 1:53 AM EDT

## 2021-10-28 NOTE — PROGRESS NOTES
PACU Transfer Note    Vitals:    10/28/21 1915   BP: (!) 151/92   Pulse: 72   Resp: 15   Temp: 97.6 °F (36.4 °C)   SpO2: 94%        In: 843 [P.O.:650; I.V.:193]  Out: -     Pain assessment:  present - adequately treated  Pain Level: 2    Report given to Receiving unit RN. Patient meets jerel discharge criteria. Patient transported to [facility] via pacu transport on RA. Patients wife, Thony Veloz, called with update regarding patient transfer. Patient was able to eat dinner piror to going upstairs and tolerated well. Patient sent with 2 hospital bags of belongings (blue coat in bag) and 1 small roll away as well as patients cell phone.      10/28/2021 7:29 PM

## 2021-10-28 NOTE — OP NOTE
[address] [facility]               [address]                                OPERATIVE REPORT    PATIENT NAME: Meghan Crockett                   :        1960  MED REC NO:   [id number]                          ROOM:  ACCOUNT NO:   [de-identified]                           ADMIT DATE: 10/28/2021  PROVIDER:     Tomeka Baker. El Pardo MD    DATE OF PROCEDURE:  10/28/2021    SURGEON:  Tomeka Baker. El Pardo MD    SECOND SURGEON:  Yuliet Ward PA-C    PREOPERATIVE DIAGNOSES:  Right,  1. 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 3.  Equinus contracture. POSTOPERATIVE DIAGNOSES:  Right,  1. 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 3.  Equinus contracture. OPERATIONS:  Right,  1. Subtalar joint arthrodesis. 2.  Talonavicular joint arthrodesis. 3.  Gastrocnemius recession. ANESTHETIC:  General with block. OPERATIVE INDICATIONS:  This is a 71-year-old gentleman with history of  significant pain and discomfort in his right hindfoot. He has had  progressive deformity in valgus with collapse of his midfoot and is felt  significant arthritis of the subtalar and talonavicular joints. The  risks and potential benefits of the procedures were discussed with the  patient. He understands these, was given the opportunity to ask  questions. His questions were answered to his satisfaction. He has  given consent to proceed with above outlined procedures. OPERATIVE PROCEDURE:  The patient was brought to the operating room and  placed in the supine position on the operating table. After induction  of general anesthetic, a pneumatic tourniquet was placed around the  patient's right proximal thigh and set to 350 mmHg. The right leg was  then prepped and draped free in the usual sterile fashion.     A second surgeon was necessary throughout the procedure due to the  patient's increased size and body mass index.  This increased the  overall technical complexity of the procedure and a second surgeon was  necessary to decrease overall operative time and to improve the  patient's safety and outcome. A second surgeon was necessary to aid in  positioning the patient, positioning the extremity in space. A second  surgeon was necessary for major deformity correction. An assistant with  these skills was not available from the hospital at the time of the  procedure necessitating Salvatore Queen presence. GASTROCNEMIUS RECESSION:  At this point, an intraoperative Silfverskiold  test was performed. This revealed a significant equinus contracture. Incision was therefore made in line with the axis of extremity, centered  over the distal aponeurosis of the gastrocnemius muscle. Blunt  dissection was carried out through the subcutaneous and deeper tissue  taking care to identify and protect the sural nerve. Deeper dissection  revealed the overlying aponeurosis of the gastrocnemius muscle. This  was held under tension by dorsiflexing the ankle and divided  transversely under direct visualization. This allowed about 4 cm  lengthening nicely correcting the patient's equinus contracture. This  wound was irrigated with a sterile lavage solution and closed with 3-0  nylon suture. Final dorsiflexion with the knee in extension was 15  degrees. SUBTALAR JOINT ARTHRODESIS:  At this point, the extremity was  exsanguinated. The pneumatic tourniquet inflated. An incision was made  from the tip of the fibula towards the base of the fourth metatarsal.   Dissection was carried out through the subcutaneous and deeper tissue  taking care to identify and protect the small branches of the  superficial peroneal nerve superiorly and sural nerve inferiorly. Dissection into the tarsal sinus was performed where significant  synovitis was appreciated. The joint was distracted and the subtalar  joint inspected.   End-stage arthritis of the posterior middle and  anterior facets of the subtalar joint was appreciated. These joints  were repaired for arthrodesis with an AO chisel. This was used to  remove articular cartilage from the posterior middle and anterior facets  superiorly and inferiorly. Subchondral bone was also removed and  feathered to expose healthy cancellous bone. This allowed the patient's  deformity to be corrected from a preoperative deformity of approximately  25 degrees of calcaneovalgus to the desired corrected position of 7  degrees of calcaneovalgus. The arthrodesis surfaces were feathered with  the AO chisel. Final positioning and fixation was achieved following  preparation of the talonavicular joint. Repositioning to expose the  talonavicular joint, the tourniquet itself suddenly deflated. For this  reason, the procedure was stopped and the tourniquet was inspected. The  Velcro had broken free from the tourniquet. The tourniquet was  re-released and re-placed, Velcro was fixed proportionally and  appropriately. The extremity re-exsanguinated with the Esmarch  tourniquet and the pneumatic tourniquet re-inflated. TALONAVICULAR JOINT ARTHRODESIS:  At this point, an incision was made  along the medial aspect of the talonavicular joint. Dissection was  carried out through the subcutaneous and deeper tissue and the  talonavicular joint capsule was elevated superiorly and inferiorly. The  joint was distracted with a Hintermann joint distractor and this  afforded excellent visualization of the talar head and navicular  significant arthritic changes were appreciated with near full-thickness  articular cartilage loss noted at the talar head. The joints were  prepared for arthrodesis with an AO chisel. This was used to remove all  remaining articular cartilage as well as subchondral bone from the talar  head and proximal navicular articular surface.   Surfaces were feathered  with the AO chisel and a 5 mm farzad was used to further promote a  healthy-appearing arthrodesis bed. This was done under copious  irrigation. At this point, attention was turned to fixation and  positioning of the fusion. The calcaneus was placed in the desired  corrected position of approximately 7 degrees calcaneovalgus. This was  pinned with two guidewires for the Extremity Medical 6.5 mm hindfoot  bolting fusion system (Axis) and two bolts were applied in compression  mode from the calcaneus into the talar body and neck. These afforded  rigid and excellent compression and stability of the fusion site. The  talonavicular joint at this point was placed in the desired corrected  position. This was done to restore the longitudinal arch position. This was pinned with guidewires and checked with multiplanar  fluoroscopy, then compressed with Extremity HealthyChic IO Fix post and  screw construct (5 mm), inferiorly an IO Fix 4.0 mm cannulated screw was  applied and superiorly a MediSapiensE nitinol compression staple was  applied. This afforded rigid and excellent compression and stability of  the talonavicular fusion site. Final radiographs were obtained with  multiplanar fluoroscopy. The wounds at this point were copiously  irrigated with a sterile lavage solution. Bone grafting was done with a  combination of BioFuse demineralized bone matrix, a XXS Infuse sponge  which had been prepared per 's recommendations was  morselized and placed throughout the subtalar and talonavicular fusion  sites and a piece of freeze-dried iliac crest allograft was shaped to  match the defect in the tarsal sinus and impacted into place. This  afforded complete filling of all fusion surfaces with bone graft. The  wounds at this point were again irrigated and closed in layers. The  extensor digitorum brevis muscle and talonavicular joint capsule  reapproximated with 2-0 PDS suture. The subcutaneous tissues  reapproximated with 3-0 Vicryl suture.   The skin edges reapproximated  with interrupted 3-0 nylon. There were no drains and no complications. The sponge and needle counts  were noted be correct at end of the procedure by the nursing staff. Multiplanar fluoroscopy was utilized throughout the procedure. I  personally operated and supervised use the multiplanar fluoroscopy  throughout the procedure. Estimated blood loss was minimal due to use  of a tourniquet.         Claribel Herring MD    D: 10/28/2021 17:26:55       T: 10/28/2021 17:29:40     [email or web address]  Job#: [id number]     Doc#: [id number]    CC:

## 2021-10-28 NOTE — BRIEF OP NOTE
Brief Postoperative Note      Patient: Sandrine Tierney  YOB: 1960  MRN: [id number]    Date of Procedure: 10/28/2021    Pre-Op Diagnosis: Primary osteoarthritis of right foot [M19.071]    Post-Op Diagnosis: Same       Procedure(s):  RIGHT: SUBTALAR JOINT ARTHRODESIS, TALONAVICULAR JOINT ARTHRODESIS, , GASTROCNEMIUS RECESSION    Surgeon(s):  Sigrid Oviedo MD    Assistant:  First Assistant: ROXANNA Ugarte    Anesthesia: General    Estimated Blood Loss (mL): less than 50     Complications: None    Specimens:   * No specimens in log *    Implants:  Implant Name Type Inv. Item Serial No.  Lot No. LRB No. Used Action   KIT GRFT SUB 2XSM 0.7ML 1.05MG RHBMP-2 FRZ DRY ST H2O CLLGN  KIT GRFT SUB 2XSM 0.7ML 1.05MG RHBMP-2 FRZ DRY ST H2O CLLGN  [facility] [id number] Right 1 Implanted   KIT STPL [id number] BNE FIX BRDG 2 LEG CONT COMPR BME ELITE  KIT STPL [id number] BNE FIX BRDG 2 LEG CONT COMPR BME ELITE  [employer]- [id number] Right 1 Implanted   GRAFT BNE SUB [id number] [place] CREST - [id number]  GRAFT BNE SUB [id number] [place] CREST [id number] MUSCULOSKELETAL TRANSPLANT [facility]  Right 1 Implanted   BEAM FIX L95MM DIA6. 5MM ARTH FOR CHARCOT DEFORMITY AXIS  BEAM FIX L95MM DIA6. 5MM ARTH FOR CHARCOT DEFORMITY AXIS  [facility]  Right 1 Implanted   BEAM FIX L100MM DIA6. 5MM ARTH FOR CHARCOT DEFORMITY AXIS  BEAM FIX L100MM DIA6. 5MM ARTH FOR CHARCOT DEFORMITY AXIS  [facility]  Right 1 Implanted   SCREW BONE SM L30MM AQUA FOR INTOSS FIXATIONX-POST IOFIX 2.0  SCREW BONE SM L30MM AQUA FOR INTOSS FIXATIONX-POST IOFIX 2.0  [facility]  Right 1 Implanted   SCREW BONE L40MM DIA5MM TAPR LCK FOR INTOSS FIX IOFIX 2.0  SCREW BONE L40MM DIA5MM TAPR LCK FOR INTOSS FIX IOFIX 2.0  EXTREMITY MEDICAL-WD  Right 1 Implanted   SCREW BONE L36MM DIA4. 5MM HD NONLOCKING LO PROF FOR INTOSS  SCREW BONE L36MM DIA4.5MM HD NONLOCKING LO PROF FOR INTOSS  EXTREMITY MEDICAL-WD  Right 1 Implanted   BIOFUSE MATRIX 5CC      [id number] Right 1 Implanted   BIOFUSE MATRIX 2CC      [id number] Right 1 Implanted         Drains: * No LDAs found *    Findings: See Above.      Electronically signed by Archana White MD on 10/28/2021 at 4:51 PM

## 2021-10-28 NOTE — ANESTHESIA POSTPROCEDURE EVALUATION
Department of Anesthesiology  Postprocedure Note    Patient: Toro Monahan  MRN: [id number]  YOB: 1960  Date of evaluation: 10/28/2021  Time:  6:40 PM     Procedure Summary     Date: 10/28/21 Room / Location: [address] [place] [address]    Anesthesia Start: 1100 Anesthesia Stop: 7996    Procedure: RIGHT: SUBTALAR JOINT ARTHRODESIS, TALONAVICULAR JOINT ARTHRODESIS, , GASTROCNEMIUS RECESSION (Right Ankle) Diagnosis:       Primary osteoarthritis of right foot      (Primary osteoarthritis of right foot [M19.071])    Surgeons: Chris Fermin MD Responsible Provider: Nina Marr MD    Anesthesia Type: general, regional ASA Status: 3          Anesthesia Type: general, regional    Xochitl Phase I: Xochitl Score: 8    Xochitl Phase II:      Last vitals: Reviewed and per EMR flowsheets.        Anesthesia Post Evaluation    Patient location during evaluation: PACU  Patient participation: complete - patient participated  Level of consciousness: awake and alert  Pain score: 0  Airway patency: patent  Nausea & Vomiting: no nausea and no vomiting  Complications: no  Cardiovascular status: hemodynamically stable  Respiratory status: acceptable  Hydration status: euvolemic

## 2021-10-28 NOTE — FLOWSHEET NOTE
Patient as been assigned to , currently occupied. Patients wife in to visit patient in PACU. Dinner ordered for patient to be delivered to PACU 13.

## 2021-10-28 NOTE — ANESTHESIA PRE PROCEDURE
Department of Anesthesiology  Preprocedure Note       Name:  Shana Lucas   Age:  62 y.o.  :  1960                                          MRN:  [id number]         Date:  10/28/2021      Surgeon: Matilda Mcmahon):  Virginia Reyes MD    Procedure: Procedure(s):  RIGHT: SUBTALAR JOINT ARTHRODESIS, TALONAVICULAR JOINT ARTHRODESIS, POSSIBLE TRIPLE ARTHRODESIS, GASTROCNEMIUS RECESSION    Medications prior to admission:   Prior to Admission medications    Medication Sig Start Date End Date Taking? Authorizing Provider   DULoxetine (CYMBALTA) 30 MG extended release capsule Take 30 mg by mouth daily 10/18/21  Yes Historical Provider, MD   atenolol-chlorthalidone (TENORETIC) 100-25 MG per tablet Take 1 tablet by mouth daily   Yes Historical Provider, MD   ibuprofen (ADVIL;MOTRIN) 200 MG tablet Take 200 mg by mouth every 6 hours as needed for Pain   Yes Historical Provider, MD   Manzo Scheuermann Oil 1000 MG CAPS Take by mouth   Yes Historical Provider, MD   Multiple Vitamin (MULTIVITAMIN ADULT PO) Take by mouth   Yes Historical Provider, MD       Current medications:    Current Facility-Administered Medications   Medication Dose Route Frequency Provider Last Rate Last Admin    lactated ringers infusion   IntraVENous Continuous Virginia Reyes MD        lactated ringers infusion   IntraVENous Continuous Virginia Reyes MD        clindamycin (CLEOCIN) 900 mg in dextrose 5 % 50 mL IVPB  900 mg IntraVENous Once Virginia Reyes MD        lactated ringers infusion   IntraVENous Continuous Amie Thomas MD        sodium chloride flush 0.9 % injection 10 mL  10 mL IntraVENous 2 times per day Amie Thomas MD        sodium chloride flush 0.9 % injection 10 mL  10 mL IntraVENous PRN Amie Thomas MD        0.9 % sodium chloride infusion  25 mL IntraVENous PRN Amie Thomas MD        lidocaine PF 1 % injection 1 mL  1 mL IntraDERmal Once PRN Amie Thomas MD           Allergies:     Allergies   Allergen Evaluation    Airway: Mallampati: II     Neck ROM: full  Mouth opening: > = 3 FB Dental:          Pulmonary:       (-) COPD, asthma, shortness of breath, sleep apnea and not a current smoker                           Cardiovascular:  Exercise tolerance: good (>4 METS),   (+) hypertension:,     (-) past MI and  angina                Neuro/Psych:      (-) seizures           GI/Hepatic/Renal:   (+) morbid obesity     (-) GERD       Endo/Other:        (-) diabetes mellitus               Abdominal:             Vascular: Other Findings:             Anesthesia Plan      general and regional     ASA 3     (-npo [place]  -denies chest pain or palp. Good ex abril he states  -discussed risk benefits of regional /nerve block)  Induction: intravenous. MIPS: Postoperative opioids intended. Anesthetic plan and risks discussed with patient. Plan discussed with CRNA.                   Troy Riggins MD   10/28/2021

## 2021-10-29 VITALS
WEIGHT: 315 LBS | OXYGEN SATURATION: 96 % | HEIGHT: 75 IN | SYSTOLIC BLOOD PRESSURE: 171 MMHG | BODY MASS INDEX: 39.17 KG/M2 | HEART RATE: 74 BPM | RESPIRATION RATE: 16 BRPM | TEMPERATURE: 98.1 F | DIASTOLIC BLOOD PRESSURE: 94 MMHG

## 2021-10-29 PROCEDURE — 6370000000 HC RX 637 (ALT 250 FOR IP): Performed by: PHYSICIAN ASSISTANT

## 2021-10-29 PROCEDURE — 2500000003 HC RX 250 WO HCPCS: Performed by: PHYSICIAN ASSISTANT

## 2021-10-29 PROCEDURE — 97535 SELF CARE MNGMENT TRAINING: CPT

## 2021-10-29 PROCEDURE — 97165 OT EVAL LOW COMPLEX 30 MIN: CPT

## 2021-10-29 PROCEDURE — G0378 HOSPITAL OBSERVATION PER HR: HCPCS

## 2021-10-29 PROCEDURE — 97116 GAIT TRAINING THERAPY: CPT

## 2021-10-29 PROCEDURE — 6360000002 HC RX W HCPCS: Performed by: PHYSICIAN ASSISTANT

## 2021-10-29 PROCEDURE — 97530 THERAPEUTIC ACTIVITIES: CPT

## 2021-10-29 PROCEDURE — 97161 PT EVAL LOW COMPLEX 20 MIN: CPT

## 2021-10-29 RX ORDER — ASPIRIN 325 MG
325 TABLET, DELAYED RELEASE (ENTERIC COATED) ORAL
Qty: 30 TABLET | Refills: 3 | COMMUNITY
Start: 2021-10-29

## 2021-10-29 RX ORDER — HYDROCODONE BITARTRATE AND ACETAMINOPHEN 5; 325 MG/1; MG/1
1-2 TABLET ORAL
Qty: 40 TABLET | Refills: 0 | COMMUNITY
Start: 2021-10-29 | End: 2021-11-05

## 2021-10-29 RX ADMIN — CLINDAMYCIN PHOSPHATE 900 MG: 900 INJECTION, SOLUTION INTRAVENOUS at 04:41

## 2021-10-29 RX ADMIN — HYDROCODONE BITARTRATE AND ACETAMINOPHEN 1 TABLET: 5; 325 TABLET ORAL at 09:50

## 2021-10-29 RX ADMIN — HEPARIN SODIUM 5000 UNITS: 5000 INJECTION INTRAVENOUS; SUBCUTANEOUS at 04:41

## 2021-10-29 ASSESSMENT — PAIN DESCRIPTION - FREQUENCY: FREQUENCY: CONTINUOUS

## 2021-10-29 ASSESSMENT — PAIN DESCRIPTION - PAIN TYPE: TYPE: SURGICAL PAIN

## 2021-10-29 ASSESSMENT — PAIN DESCRIPTION - ORIENTATION: ORIENTATION: RIGHT

## 2021-10-29 ASSESSMENT — PAIN DESCRIPTION - ONSET: ONSET: ON-GOING

## 2021-10-29 ASSESSMENT — PAIN DESCRIPTION - PROGRESSION: CLINICAL_PROGRESSION: NOT CHANGED

## 2021-10-29 ASSESSMENT — PAIN DESCRIPTION - DESCRIPTORS: DESCRIPTORS: ACHING

## 2021-10-29 ASSESSMENT — PAIN DESCRIPTION - LOCATION: LOCATION: ANKLE

## 2021-10-29 ASSESSMENT — PAIN SCALES - GENERAL: PAINLEVEL_OUTOF10: 4

## 2021-10-29 NOTE — PLAN OF CARE
Problem: Falls - Risk of:  Goal: Will remain free from falls  Description: Will remain free from falls  Outcome: Ongoing   Patient remains free from falls during this shift. Patient is up x1 person assist with walker. NWB to RLE. Bed is in the lowest position and the bed alarm is activated. Anti-slip socks are on. Call light is within reach. Will continue to monitor and reassess. Problem: Pain:  Goal: Pain level will decrease  Description: Pain level will decrease  Outcome: Ongoing   RN assesses pain using 0-10 scale. Patient understands how to rate pain using 0-10 scale. Pain is controled with medication per MAR. RN encourages patient to call out for breakthrough pain. Will continue to monitor and reassess. Problem: Skin Integrity:  Goal: Skin integrity will be maintained  Outcome: Ongoing   4 eye skin assessment completed. No new skin breakdown thus far this shift. Will continue to monitor and reassess.

## 2021-10-29 NOTE — PROGRESS NOTES
Patient discharged with belongings and follow up instructions. New medications filled prior to admission. IV removed without complications. Patient transported to main entrance via wheelchair, spouse awaiting at [facility].

## 2021-10-29 NOTE — PROGRESS NOTES
Patient admitted to room 5525. Patient is alert and oriented. Vital signs are stable. Patient's pain is controlled with medication per MAR. Patient ambulates x1 with a walker. NWB to RLE. Patient tolerates ambulation well. Patient voiding urine without complication. Bed is in the lowest position. Bed alarm is activated. Call light is within reach. Will continue to monitor and reassess.

## 2021-10-29 NOTE — PROGRESS NOTES
Physical Therapy    Facility/Department: [facility] 5T ORTHO/NEURO  Initial Assessment/Treatment/Discharge    NAME: Lencho Najera  : 1960  MRN: [id number]    Date of Service: 10/29/2021    Discharge Recommendations: Lencho Najera scored a  on the AM-PAC short mobility form. At this time, no further PT is recommended upon discharge due to pt moving about safely with both walker & rollabout & will have 24 hr A. Recommend patient returns to prior setting with prior services. PT Equipment Recommendations  Equipment Needed: No (has bariatric standard walker & bariatric rollabout)    Assessment   Assessment: 63 yo seen POD 1 from RIGHT: SUBTALAR JOINT ARTHRODESIS, TALONAVICULAR JOINT ARTHRODESIS, , GASTROCNEMIUS RECESSION. Pt demo safe & steady mobility with both a walker & rollabout, maintaining NWB to RLE. Pt has bariatric walker & rollabout for home. Wife to provide 24 hr A at dc. Pt has no stairs to negotiate at home. Ready for dc to MD's office for cast.  Will sign off. Decision Making: Low Complexity  PT Education: PT Role;Gait Training;Weight-bearing Education;Transfer Training;Functional Mobility Training  Patient Education: verbalized & demo understanding of NWB & use of equipment; encouraged 24 hr A from wife & use of gait belt initially with wife walking with him  REQUIRES PT FOLLOW UP: No  Activity Tolerance  Activity Tolerance: Patient Tolerated treatment well       Patient Diagnosis(es): The encounter diagnosis was Arthritis of right foot. has a past medical history of Arthritis and Hypertension. has a past surgical history that includes joint replacement (Bilateral); Leg Surgery (Right); and Ankle surgery (Right, 10/28/2021). Restrictions  Position Activity Restriction  Other position/activity restrictions: NWB RLE, up with A  Vision/Hearing  Vision: Within Functional Limits  Hearing: Within functional limits     Subjective  General  Chart Reviewed:  Yes  Additional Pertinent Hx: s/p RIGHT: SUBTALAR JOINT ARTHRODESIS, TALONAVICULAR JOINT ARTHRODESIS, , GASTROCNEMIUS RECESSION on 10/28  Family / Caregiver Present: No  Referring Practitioner: ROXANNA Lance  Diagnosis: primary OA R foot  Follows Commands: Within Functional Limits  Subjective  Subjective: Found in bed, agreeable to PT. Plans to go to MD office for cast this AM & then home to his [address] [place] [address] to recuperate. Reports NWB for 6-8 weeks. Pain Screening  Patient Currently in Pain: Denies  Vital Signs  Patient Currently in Pain: Denies       Orientation  Orientation  Overall Orientation Status: Within Normal Limits  Social/Functional History  Social/Functional History  Lives With: Spouse  Type of Home: House  Home Layout: Able to Live on Main level with bedroom/bathroom, One level (GOING TO [facility]- all 1 floor with no steps)  Home Access: Level entry  Bathroom Shower/Tub: Walk-in shower  Bathroom Toilet: Bedside commode  Home Equipment: Standard walker, Crutches, Roll About (walker is bariatric)  ADL Assistance: Independent  Homemaking Assistance: Needs assistance (wife performs primarily)  Ambulation Assistance: Independent  Transfer Assistance: Independent  Active : Yes  Occupation: Full time employment  Type of occupation:   Additional Comments: No falls PTA. Wife can provide 24 hr A.       Objective          AROM RLE (degrees)  RLE General AROM: NT- due to surgery (appears WFL at hip/knee)  AROM LLE (degrees)  LLE AROM : WNL  Strength RLE  Comment: NT due to surgery  Strength LLE  Strength LLE: WFL        Bed mobility  Supine to Sit: Independent  Transfers  Sit to Stand: Stand by assistance (elev bed height; effortful (performed x 2))  Pt states he plans to back out of his bed on his knees & just stand up from a tall knee position on bed. (Since his bed does not adjust).   Stand to sit: Stand by assistance  Ambulation  Ambulation?: Yes  WB Status: NWB RLE  More Ambulation?: Yes  Ambulation 1  Surface: level tile  Device: Rolling Walker  Assistance: Contact guard assistance;Stand by assistance (progressed to CGA- SBA)  Quality of Gait: steady gait, fairly rapid pace- cues to slow down & take his time; maintained NWB without difficulty  Distance: 35'  Ambulation 2  Surface - 2: level tile  Device 2:  (ROLLABOUT)  Assistance 2: Modified Independent  Quality of Gait 2: steady gait/mobility with ease; no LOB; maneuvered rollabout without difficulty in casillas & room, around turns & performed 360 degree turn  Distance: 100'  Comments: Encour[facility] to use [facility]. Balance  Sitting - Static: Good  Sitting - Dynamic: Good  Standing - Static: Good (with walker, NWB RLE)  Standing - Dynamic: Good (with walker/rollabout NWB RLE)    Treatment included gait/transfer training, pt education.       Plan   Safety Devices  Type of devices:  (left up in room with OT for evaluation)                                                    AM-PAC Score  AM-PAC Inpatient Mobility Raw Score : 19 (10/29/21 0843)  AM-PAC Inpatient T-Scale Score : 45.44 (10/29/21 0843)  Mobility Inpatient CMS 0-100% Score: 41.77 (10/29/21 0843)  Mobility Inpatient CMS G-Code Modifier : CK (10/29/21 0843)          Goals  Short term goals  Time Frame for Short term goals: N/A - dc this am to home       Therapy Time   Individual Concurrent Group Co-treatment   Time In 0750         Time Out 0828         Minutes 38           Timed Code Treatment Minutes:   25    Total Treatment Minutes:  [place]

## 2021-10-29 NOTE — PROGRESS NOTES
Occupational Therapy   Occupational Therapy Initial Assessment/Treatment  Date: 10/29/2021   Patient Name: Russel Honeycutt  MRN: [id number]     : 1960    Date of Service: 10/29/2021    Discharge Recommendations:  Russel Honeycutt scored a 23/24 on the AM-PAC ADL Inpatient form. At this time, no further OT is recommended upon discharge due. Recommend patient returns to prior setting with prior services. OT Equipment Recommendations  Equipment Needed: No    Assessment   Assessment: Pt evaluated POD #1 following R ankle Sx. Pt demo safe mobility and ADLs using rollabout. Demo adherence to NWB. Pt has no skilled OT needs, will sign off. Pt plans d/c home with 24hr assist from wife  Decision Making: Low Complexity  REQUIRES OT FOLLOW UP: No  Activity Tolerance  Activity Tolerance: Patient Tolerated treatment well  Safety Devices  Safety Devices in place: Yes  Type of devices: Nurse notified;Call light within reach; Bed alarm in place; Left in bed           Patient Diagnosis(es): The encounter diagnosis was Arthritis of right foot. has a past medical history of Arthritis and Hypertension. has a past surgical history that includes joint replacement (Bilateral); Leg Surgery (Right); and Ankle surgery (Right, 10/28/2021). Restrictions  Position Activity Restriction  Other position/activity restrictions: NWB RLE, up with A    Subjective   General  Chart Reviewed:  Yes  Additional Pertinent Hx: s/p RIGHT: SUBTALAR JOINT ARTHRODESIS, TALONAVICULAR JOINT ARTHRODESIS, , GASTROCNEMIUS RECESSION on 10/28  Diagnosis: OA R foot  Subjective  Subjective: Pt ambulating hallway with PT and crutches upon OT entry, agreeable to therapy   Pain: denied    Social/Functional History  Social/Functional History  Lives With: Spouse  Type of Home: House  Home Layout: Able to Live on Main level with bedroom/bathroom, One level ([address]- all 1 floor with no steps)  Home Access: Level entry  [place] Shower/Tub: Walk-in shower  Bathroom Toilet: Bedside commode  Home Equipment: Standard walker, Crutches, Roll About (walker is bariatric)  ADL Assistance: Independent  Homemaking Assistance: Needs assistance (wife performs primarily)  Ambulation Assistance: Independent  Transfer Assistance: Independent  Active : Yes  Occupation: Full time employment  Type of occupation:   Additional Comments: No falls PTA. Wife can provide 24 hr A.       Objective   Vision: Within Functional Limits  Hearing: Within functional limits    Orientation  Overall Orientation Status: Within Normal Limits     Balance  Sitting Balance: Independent  Standing Balance: Modified independent   Functional Mobility  Functional - Mobility Device:  (rollabout)  Activity: To/from bathroom  Assist Level: Modified independent   Functional Mobility Comments: also used crutches in room with mod I, demo NWB R. Pt plans to primarily use rollabout at home, use crutches on stairs and to access toilet in small bathroom space  Toilet Transfers  Toilet - Technique: Ambulating  Equipment Used: Standard bedside commode (has BSC at home over toilet)  Toilet Transfer: Modified independent  ADL  Feeding: Independent  Grooming: Independent (in stance at sink with R LE in rollabout)  LE Dressing: Modified independent  (reports donning shorts earlier while eob and shifting hips side to side to pull up shorts)  Toileting: Modified independent   Additional Comments: Pt demo adherence to NWB during ADLs using rollabout.  Pt has DME for modified ADLs at home, plans to use [facility] in shower for bathing        Bed mobility  Sit to Supine: Independent  Transfers  Sit to stand: Modified independent  Stand to sit: Modified independent     Cognition  Overall Cognitive Status: WNL                 LUE AROM (degrees)  LUE AROM : WFL  RUE AROM (degrees)  RUE AROM : WFL             Treatment consisted of:  ADLs, transfer training, education on activity promotion and fall precautions.              Plan   Plan  Times per week: d/c      AM-PAC Score        AM-PAC Inpatient Daily Activity Raw Score: 23 (10/29/21 1546)  AM-PAC Inpatient ADL T-Scale Score : 51.12 (10/29/21 1546)  ADL Inpatient CMS 0-100% Score: 15.86 (10/29/21 1546)  ADL Inpatient CMS G-Code Modifier : CI (10/29/21 1546)      Therapy Time   Individual Concurrent Group Co-treatment   Time In 0821         Time Out 0845         Minutes 24         Timed Code Treatment Minutes: 9 Minutes +15 min lucio Randall, OT

## 2021-10-29 NOTE — PROGRESS NOTES
Patient A&Ox4, VSS with exception to an elevated BP. Patient took home BP medicine. Surgical ace wrap with scant drainage, dressing reinforced. Pain minimal, managed with oral medications. Neuro checks unchanged, pt reports some numbness after block, able to wiggle toes, skin warm with good cap refills. Patient ambulating with walker, tolerating NWB well. Patient anticipating discharge home this morning. Will continue to monitor.

## 2021-10-29 NOTE — PROGRESS NOTES
Ortho Progress Note    POD 1 s/p double arthrodesis and gastrocnemius recession. He has no complaints at this time. Dressings clean, dry and intact. Good capillary refill in all digits. Block resolving. Continue NWB on operative extremity. PT/OT today. D/c to home today as long as he does well with therapy and pain is under control. His pain medication was e-prescribed to the [facility].

## 2021-10-29 NOTE — PLAN OF CARE
Problem: Falls - Risk of:  Goal: Will remain free from falls  Description: Will remain free from falls  10/29/2021 0931 by Tristen Cabrera RN  Outcome: Ongoing     Problem: Pain:  Goal: Pain level will decrease  Description: Pain level will decrease  10/29/2021 0931 by Tristen Cabrera RN  Outcome: Ongoing     Problem: Pain:  Goal: Control of acute pain  Description: Control of acute pain  Outcome: Ongoing     Problem: Safety:  Goal: Ability to remain free from injury will improve  Outcome: Ongoing     Problem: Sensory:  Goal: Pain level will decrease  Description: Pain level will decrease  10/29/2021 0931 by Tristen Cabrera RN  Outcome: Ongoing     Problem: Urinary Elimination:  Goal: Ability to reestablish a normal urinary elimination pattern will improve - after catheter removal  Outcome: Ongoing

## 2021-11-04 NOTE — H&P
Date of Surgery Update:  Paramjit Briceno was seen, history and physical examination reviewed, and patient examined by me ib 10/29/2021. There were no significant clinical changes since the completion of the previous history and physical.    The risk, benefits, and alternatives of the proposed procedure have been explained to the patient (or appropriate guardian) and understanding verbalized. All questions answered. Patient wished to proceed.     Electronically signed by: Brigido Morris MD,11/4/2021,11:23 AM

## 2021-11-04 NOTE — DISCHARGE SUMMARY
[place] DISCHARGE SUMMARY         The patient was taken to the operating room on 10/28/2021 where the aforementioned procedure was preformed. The patient was taken to the post operative anesthesia recovery unit in stable condition. The patient was then transferred to the orthopaedic floor for post operative pain management and convalesce       The patient was followed medically in the hospital for the above surgical procedures performed and below medical issues during their hospital stay    Active Problems:    Arthritis of right foot  Resolved Problems:    * No resolved hospital problems. *         (x )The patient was placed on anticoagulation therapy for DVT prophylaxis       The patient was discharged in stable condition on 10/29/2021. Please see medical reconciliation for discharge medications. The discharge instructions were explained to the patient and the family. The patient will follow up in the office at his scheduled post-op appointments.

## 2022-12-14 RX ORDER — COVID-19 ANTIGEN TEST
KIT MISCELLANEOUS
COMMUNITY

## 2022-12-14 NOTE — PROGRESS NOTES
[facility] PRE-SURGICAL TESTING INSTRUCTIONS        OR 12/15/22              PRIOR TO PROCEDURE DATE:    1. PLEASE FOLLOW ANY INSTRUCTIONS GIVEN TO YOU PER YOUR SURGEON. 2. Arrange for someone to drive you home and be with you for the first 24 hours after discharge for your safety after your procedure for which you received sedation. Ensure it is someone we can share information with regarding your discharge. NOTE: At this time ONLY 2 ADULTS may accompany you   One person ENCOURAGED to stay at hospital entire time if outpatient surgery      3. You must contact your surgeon for instructions IF:  You are taking any blood thinners, aspirin, anti-inflammatory or vitamins.- AVOID UNTIL AFTER SURGERY   There is a change in your physical condition such as a cold, fever, rash, cuts, sores, or any other infection, especially near your surgical site. 4. Do not drink alcohol the day before or day of your procedure. Do not use any recreational marijuana at least 24 hours or street drugs (heroin, cocaine) at minimum 5 days prior to your procedure. 5. A Pre-Surgical History and Physical MUST be completed WITHIN 30 DAYS OR LESS prior to your procedure. by your Physician or an Urgent Care        THE DAY OF YOUR PROCEDURE:  1. Follow instructions for ARRIVAL TIME as DIRECTED BY YOUR SURGEON. 2. Enter the MAIN entrance from [employer] and follow the signs to the free Parking [employer] or [employer] (offered free of charge 7 am-5pm). 3. Enter the Main Entrance of the hospital (do not enter from the lower level of the parking garage). Upon entrance, check in with the  at the surgical information desk on your LEFT. Bring your insurance card and photo ID to register      4. DO NOT EAT ANYTHING 8 hours prior to arrival for surgery. You may have up to 8 ounces of water 4 hours prior to your arrival for surgery.    NOTE: ALL Gastric, Bariatric & Bowel surgery patients - you MUST follow your surgeon's instructions regarding eating/ drinking as you will have very specific instructions to follow. If you did not receive these, call your surgeon's office immediately. 5. MEDICATIONS:  Take the following medications with a SMALL sip of water: DULOXETINE & TENORETIC AM OF SURGERY WITH SIP OF WATER PER PHYSICIAN  Use your usual dose of inhalers the morning of surgery. BRING your rescue inhaler with you to hospital.   Anesthesia does NOT want you to take insulin the morning of surgery. They will control your blood sugar while you are at the hospital. Please contact your ordering physician for instructions regarding your insulin the night before your procedure. If you have an insulin pump, please keep it set on basal rate. Bariatric patient's call your surgeon if on diabetic medications as some may need to be stopped 1 week prior to surgery    6. Do not swallow additional water when brushing teeth. No gum, candy, mints, or ice chips. Refrain from smoking or at least decrease the amount on day of surgery. 7. Morning of surgery:   Take a shower with an antibacterial soap (i.e., Safeguard or Dial) OR your physician may have instructed you to use Hibiclens. Dress in loose, comfortable clothing appropriate for redressing after your procedure. Do not wear jewelry (including body piercings), make-up (especially NO eye make-up), fingernail polish (NO toenail polish if foot/leg surgery), lotion, powders, or metal hairclips. Do not shave or wax for 72 hours prior to procedure near your operative site. Shaving with a razor can irritate your skin and make it easier to develop an infection. On the day of your procedure, any hair that needs to be removed near the surgical site will be 'clipped' by a healthcare worker using a special clipper designed to avoid skin irritation. 8. Dentures, glasses, or contacts will need to be removed before your procedure.  Bring cases for your glasses, contacts, dentures, or hearing aids to protect them while you are in surgery. 9. If you use a CPAP, please bring it with you on the day of your procedure. 10. We recommend that valuable personal belongings such as cash, cell phones, e-tablets, or jewelry, be left at home during your stay. The hospital will not be responsible for valuables that are not secured in the hospital safe. However, if your insurance requires a co-pay, you may want to bring a method of payment, i.e., Check or credit card, if you wish to pay your co-pay the day of surgery. 11. If you are to stay overnight, you may bring a bag with personal items. Please have any large items you may need brought in by your family after your arrival to your hospital room. 12. If you have a Living Will or Durable Power of , please bring a copy on the day of your procedure. How we keep you safe and work to prevent surgical site infections:   1. Health care workers should always check your ID bracelet to verify your name and birth date. You will be asked many times to state your name, date of birth, and allergies. 2. Health care workers should always clean their hands with soap or alcohol gel before providing care to you. It is okay to ask anyone if they cleaned their hands before they touch you. 3. You will be actively involved in verifying the type of procedure you are having and ensuring the correct surgical site. This will be confirmed multiple times prior to your procedure. Do NOT yoon your surgery site UNLESS instructed to by your surgeon. 4. When you are in the operating room, your surgical site will be cleansed with a special soap, and in most cases, you will be given an antibiotic before the surgery begins. What to expect AFTER your procedure? 1. Immediately following your procedure, your will be taken to the PACU for the first phase of your recovery.   Your nurse will help you recover from any potential side effects of anesthesia, such as extreme drowsiness, changes in your vital signs or breathing patterns. Nausea, headache, muscle aches, or sore throat may also occur after anesthesia. Your nurse will help you manage these potential side effects. 2. For comfort and safety, arrange to have someone at home with you for the first 24 hours after discharge. 3. You and your family will be given written instructions about your diet, activity, dressing care, medications, and return visits. 4. Once at home, should issues with nausea, pain, or bleeding occur, or should you notice any signs of infection, you should call your surgeon. 5. Always clean your hands before and after caring for your wound. Do not let your family touch your surgery site without cleaning their hands. 6. Narcotic pain medications can cause significant constipation. You may want to add a stool softener to your postoperative medication schedule or speak to your surgeon on how best to manage this SIDE EFFECT. SPECIAL INSTRUCTIONS     Thank you for allowing us to care for you. We strive to exceed your expectations in the delivery of care and service provided to you and your family. If you need to contact the [place] [address] staff for any reason, please call us at [phone]    Instructions reviewed with patient during preadmission testing phone interview.   Laly Hagan RN.12/14/2022 .11:07 AM      ADDITIONAL EDUCATIONAL INFORMATION REVIEWED PER PHONE WITH YOU AND/OR YOUR FAMILY:  No Hibiclens® Bathing Instructions   Yes Antibacterial Soap

## 2022-12-14 NOTE — PROGRESS NOTES
Place patient label inside box (if no patient label, complete below)  Name:  :  MR#:     Trav Narrow / PROCEDURE  I (we), Moniemike Morrellsofieerna Ruiz (Patient Name) authorize DR. PAOLA Gamez (Provider / Claudeen Meter) and/or such assistants as may be selected by him/her, to perform the following operation/procedure(s): LEFT SUBTALAR JOINT ARTHRODESIS, TALONAVICULAR JOINT ARTHRODESIS, POSSIBLE TRIPLE ARTHRODESIS, GASTROCNEMIUS RECESSION       Note: If unable to obtain consent prior to an emergent procedure, document the emergent reason in the medical record. This procedure has been explained to my (our) satisfaction and included in the explanation was: The intended benefit, nature, and extent of the procedure to be performed; The significant risks involved and the probability of success; Alternative procedures and methods of treatment; The dangers and probable consequences of such alternatives (including no procedure or treatment); The expected consequences of the procedure on my future health; Whether other qualified individuals would be performing important surgical tasks and/or whether  would be present to advise or support the procedure. I (we) understand that there are other risks of infection and other serious complications in the pre-operative/procedural and postoperative/procedural stages of my (our) care. I (we) have asked all of the questions which I (we) thought were important in deciding whether or not to undergo treatment or diagnosis. These questions have been answered to my (our) satisfaction. I (we) understand that no assurance can be given that the procedure will be a success, and no guarantee or warranty of success has been given to me (us).     It has been explained to me (us) that during the course of the operation/procedure, unforeseen conditions may be revealed that necessitate extension of the original procedure(s) or different procedure(s) than those set forth in Paragraph 1. I (we) authorize and request that the above-named physician, his/her assistants or his/her designees, perform procedures as necessary and desirable if deemed to be in my (our) best interest.     Revised 8/2/2021                                                                          Page 1 of 2       I acknowledge that health care personnel may be observing this procedure for the purpose of medical education or other specified purposes as may be necessary as requested and/or approved by my (our) physician. I (we) consent to the disposal by the hospital Pathologist of the removed tissue, parts or organs in accordance with hospital policy. I do ____ do not ____ consent to the use of a local infiltration pain blocking agent that will be used by my provider/surgical provider to help alleviate pain during my procedure. I do ____ do not ____ consent to an emergent blood transfusion in the case of a life-threatening situation that requires blood components to be administered. This consent is valid for 24 hours from the beginning of the procedure. This patient does ____ or does not ____ currently have a DNR status/order. If DNR order is in place, obtain Addendum to the Surgical Consent for ALL Patients with a DNR Order to address jsoseline-operative status for limited intervention or DNR suspension.      I have read and fully understand the above Consent for Operation/Procedure and that all blanks were completed before I signed the consent.   _____________________________       _____________________      ____/____am/pm  Signature of Patient or legal representative      Printed Name / Relationship            Date / Time   ____________________________       _____________________      ____/____am/pm  Witness to Signature                                    Printed Name                    Date / Time    If patient is unable to sign or is a minor, complete the following)  Patient is a minor, ____ years of age, or unable to sign because:   ______________________________________________________________________________________________    If a phone consent is obtained, consent will be documented by using two health care professionals, each affirming that the consenting party has no questions and gives consent for the procedure discussed with the physician/provider.   _____________________          ____________________       _____/_____am/pm   2nd witness to phone consent        Printed name           Date / Time    Informed Consent:  I have provided the explanation described above in section 1 to the patient and/or legal representative.  I have provided the patient and/or legal representative with an opportunity to ask any questions about the proposed operation/procedure.   ___________________________          ____________________         ____/____am/pm  Provider / Proceduralist                            Printed name            Date / Time  Revised 8/2/2021                                                                      Page 2 of 2

## 2022-12-14 NOTE — PROGRESS NOTES
12/14/22 @ 2057 [facility] H&P / LABS / EKG FROM DR. Nawaf Benitez 95 OFFICE [phone] (UZMA) /AG    12/14/22 @ 1120 [facility] H&P / LABS.  RECALLED PCP OFFICE FOR EKG TRACING (LISY) /AG

## 2022-12-15 ENCOUNTER — HOSPITAL ENCOUNTER (OUTPATIENT)
Age: 62
Setting detail: OBSERVATION
Discharge: HOME | End: 2022-12-16
Attending: ORTHOPAEDIC SURGERY | Admitting: ORTHOPAEDIC SURGERY
Payer: COMMERCIAL

## 2022-12-15 ENCOUNTER — ANESTHESIA (OUTPATIENT)
Dept: OPERATING ROOM | Age: 62
End: 2022-12-15
Payer: COMMERCIAL

## 2022-12-15 ENCOUNTER — ANESTHESIA EVENT (OUTPATIENT)
Dept: OPERATING ROOM | Age: 62
End: 2022-12-15
Payer: COMMERCIAL

## 2022-12-15 DIAGNOSIS — M19.072 ARTHRITIS OF LEFT FOOT: Primary | ICD-10-CM

## 2022-12-15 LAB
GLUCOSE BLD-MCNC: 151 MG/DL (ref 70–99)
PERFORMED ON: ABNORMAL

## 2022-12-15 PROCEDURE — C1769 GUIDE WIRE: HCPCS | Performed by: ORTHOPAEDIC SURGERY

## 2022-12-15 PROCEDURE — G0378 HOSPITAL OBSERVATION PER HR: HCPCS

## 2022-12-15 PROCEDURE — 2720000010 HC SURG SUPPLY STERILE: Performed by: ORTHOPAEDIC SURGERY

## 2022-12-15 PROCEDURE — 36415 COLL VENOUS BLD VENIPUNCTURE: CPT

## 2022-12-15 PROCEDURE — A4217 STERILE WATER/SALINE, 500 ML: HCPCS | Performed by: ORTHOPAEDIC SURGERY

## 2022-12-15 PROCEDURE — 64447 NJX AA&/STRD FEMORAL NRV IMG: CPT | Performed by: ANESTHESIOLOGY

## 2022-12-15 PROCEDURE — 7100000001 HC PACU RECOVERY - ADDTL 15 MIN: Performed by: ORTHOPAEDIC SURGERY

## 2022-12-15 PROCEDURE — 2500000003 HC RX 250 WO HCPCS: Performed by: NURSE ANESTHETIST, CERTIFIED REGISTERED

## 2022-12-15 PROCEDURE — 2580000003 HC RX 258: Performed by: ORTHOPAEDIC SURGERY

## 2022-12-15 PROCEDURE — C1713 ANCHOR/SCREW BN/BN,TIS/BN: HCPCS | Performed by: ORTHOPAEDIC SURGERY

## 2022-12-15 PROCEDURE — 3600000004 HC SURGERY LEVEL 4 BASE: Performed by: ORTHOPAEDIC SURGERY

## 2022-12-15 PROCEDURE — 6360000002 HC RX W HCPCS: Performed by: NURSE ANESTHETIST, CERTIFIED REGISTERED

## 2022-12-15 PROCEDURE — 6360000002 HC RX W HCPCS: Performed by: ANESTHESIOLOGY

## 2022-12-15 PROCEDURE — 3700000000 HC ANESTHESIA ATTENDED CARE: Performed by: ORTHOPAEDIC SURGERY

## 2022-12-15 PROCEDURE — 64445 NJX AA&/STRD SCIATIC NRV IMG: CPT | Performed by: ANESTHESIOLOGY

## 2022-12-15 PROCEDURE — 2580000003 HC RX 258: Performed by: ANESTHESIOLOGY

## 2022-12-15 PROCEDURE — 6370000000 HC RX 637 (ALT 250 FOR IP): Performed by: ORTHOPAEDIC SURGERY

## 2022-12-15 PROCEDURE — 3700000001 HC ADD 15 MINUTES (ANESTHESIA): Performed by: ORTHOPAEDIC SURGERY

## 2022-12-15 PROCEDURE — 7100000000 HC PACU RECOVERY - FIRST 15 MIN: Performed by: ORTHOPAEDIC SURGERY

## 2022-12-15 PROCEDURE — 6360000002 HC RX W HCPCS: Performed by: ORTHOPAEDIC SURGERY

## 2022-12-15 PROCEDURE — 3600000014 HC SURGERY LEVEL 4 ADDTL 15MIN: Performed by: ORTHOPAEDIC SURGERY

## 2022-12-15 PROCEDURE — 80069 RENAL FUNCTION PANEL: CPT

## 2022-12-15 PROCEDURE — 2500000003 HC RX 250 WO HCPCS: Performed by: ANESTHESIOLOGY

## 2022-12-15 PROCEDURE — 2500000003 HC RX 250 WO HCPCS: Performed by: ORTHOPAEDIC SURGERY

## 2022-12-15 PROCEDURE — 94150 VITAL CAPACITY TEST: CPT

## 2022-12-15 PROCEDURE — 2709999900 HC NON-CHARGEABLE SUPPLY: Performed by: ORTHOPAEDIC SURGERY

## 2022-12-15 DEVICE — FIXATION BEAM, 6.5 X 95 MM
Type: IMPLANTABLE DEVICE | Site: FOOT | Status: FUNCTIONAL
Brand: AXIS

## 2022-12-15 DEVICE — LAG SCREW (CANNULATED TAPERED) - 5.0 X 36 MM
Type: IMPLANTABLE DEVICE | Site: FOOT | Status: FUNCTIONAL
Brand: IO FIX

## 2022-12-15 DEVICE — IMPLANTABLE DEVICE: Type: IMPLANTABLE DEVICE | Site: FOOT | Status: FUNCTIONAL

## 2022-12-15 DEVICE — BONE GRAFT KIT 7510050 INFUSE XX SMALL
Type: IMPLANTABLE DEVICE | Site: FOOT | Status: FUNCTIONAL
Brand: INFUSE® BONE GRAFT

## 2022-12-15 DEVICE — SMALL X-POST - 30MM
Type: IMPLANTABLE DEVICE | Site: FOOT | Status: FUNCTIONAL
Brand: IO FIX

## 2022-12-15 DEVICE — FIXATION BEAM, 6.5 X 90 MM
Type: IMPLANTABLE DEVICE | Site: FOOT | Status: FUNCTIONAL
Brand: AXIS

## 2022-12-15 DEVICE — KIT STPL BRDG 25MM 4 LEG 20MM MAX CLSR 7.1MM BME ELITE: Type: IMPLANTABLE DEVICE | Site: FOOT | Status: FUNCTIONAL

## 2022-12-15 RX ORDER — SODIUM CHLORIDE, SODIUM LACTATE, POTASSIUM CHLORIDE, CALCIUM CHLORIDE 600; 310; 30; 20 MG/100ML; MG/100ML; MG/100ML; MG/100ML
INJECTION, SOLUTION INTRAVENOUS CONTINUOUS
Status: DISCONTINUED | OUTPATIENT
Start: 2022-12-15 | End: 2022-12-15 | Stop reason: HOSPADM

## 2022-12-15 RX ORDER — MIDAZOLAM HYDROCHLORIDE 1 MG/ML
INJECTION INTRAMUSCULAR; INTRAVENOUS
Status: COMPLETED
Start: 2022-12-15 | End: 2022-12-15

## 2022-12-15 RX ORDER — GABAPENTIN 100 MG/1
100 CAPSULE ORAL 2 TIMES DAILY
Status: DISCONTINUED | OUTPATIENT
Start: 2022-12-15 | End: 2022-12-16 | Stop reason: HOSPADM

## 2022-12-15 RX ORDER — ONDANSETRON 2 MG/ML
4 INJECTION INTRAMUSCULAR; INTRAVENOUS EVERY 6 HOURS PRN
Status: DISCONTINUED | OUTPATIENT
Start: 2022-12-15 | End: 2022-12-16 | Stop reason: HOSPADM

## 2022-12-15 RX ORDER — CLINDAMYCIN PHOSPHATE 900 MG/50ML
900 INJECTION INTRAVENOUS EVERY 8 HOURS
Status: COMPLETED | OUTPATIENT
Start: 2022-12-15 | End: 2022-12-16

## 2022-12-15 RX ORDER — BUPIVACAINE HYDROCHLORIDE 5 MG/ML
INJECTION, SOLUTION EPIDURAL; INTRACAUDAL PRN
Status: DISCONTINUED | OUTPATIENT
Start: 2022-12-15 | End: 2022-12-15 | Stop reason: SDUPTHER

## 2022-12-15 RX ORDER — PROPOFOL 10 MG/ML
INJECTION, EMULSION INTRAVENOUS PRN
Status: DISCONTINUED | OUTPATIENT
Start: 2022-12-15 | End: 2022-12-15 | Stop reason: SDUPTHER

## 2022-12-15 RX ORDER — SODIUM CHLORIDE 0.9 % (FLUSH) 0.9 %
5-40 SYRINGE (ML) INJECTION PRN
Status: DISCONTINUED | OUTPATIENT
Start: 2022-12-15 | End: 2022-12-15 | Stop reason: HOSPADM

## 2022-12-15 RX ORDER — SUCCINYLCHOLINE/SOD CL,ISO/PF 200MG/10ML
SYRINGE (ML) INTRAVENOUS PRN
Status: DISCONTINUED | OUTPATIENT
Start: 2022-12-15 | End: 2022-12-15 | Stop reason: SDUPTHER

## 2022-12-15 RX ORDER — FENTANYL CITRATE 50 UG/ML
INJECTION, SOLUTION INTRAMUSCULAR; INTRAVENOUS
Status: COMPLETED
Start: 2022-12-15 | End: 2022-12-15

## 2022-12-15 RX ORDER — ONDANSETRON 4 MG/1
4 TABLET, ORALLY DISINTEGRATING ORAL EVERY 8 HOURS PRN
Status: DISCONTINUED | OUTPATIENT
Start: 2022-12-15 | End: 2022-12-16 | Stop reason: HOSPADM

## 2022-12-15 RX ORDER — ONDANSETRON 2 MG/ML
INJECTION INTRAMUSCULAR; INTRAVENOUS PRN
Status: DISCONTINUED | OUTPATIENT
Start: 2022-12-15 | End: 2022-12-15 | Stop reason: SDUPTHER

## 2022-12-15 RX ORDER — SODIUM CHLORIDE 9 MG/ML
INJECTION, SOLUTION INTRAVENOUS CONTINUOUS
Status: DISCONTINUED | OUTPATIENT
Start: 2022-12-15 | End: 2022-12-16 | Stop reason: HOSPADM

## 2022-12-15 RX ORDER — MIDAZOLAM HYDROCHLORIDE 1 MG/ML
INJECTION INTRAMUSCULAR; INTRAVENOUS PRN
Status: DISCONTINUED | OUTPATIENT
Start: 2022-12-15 | End: 2022-12-15 | Stop reason: SDUPTHER

## 2022-12-15 RX ORDER — HYDROCODONE BITARTRATE AND ACETAMINOPHEN 5; 325 MG/1; MG/1
2 TABLET ORAL EVERY 4 HOURS PRN
Status: DISCONTINUED | OUTPATIENT
Start: 2022-12-15 | End: 2022-12-16 | Stop reason: HOSPADM

## 2022-12-15 RX ORDER — SODIUM CHLORIDE 0.9 % (FLUSH) 0.9 %
5-40 SYRINGE (ML) INJECTION EVERY 12 HOURS SCHEDULED
Status: DISCONTINUED | OUTPATIENT
Start: 2022-12-15 | End: 2022-12-16 | Stop reason: HOSPADM

## 2022-12-15 RX ORDER — GABAPENTIN 100 MG/1
100 CAPSULE ORAL 2 TIMES DAILY
COMMUNITY
Start: 2022-12-06

## 2022-12-15 RX ORDER — HYDROCODONE BITARTRATE AND ACETAMINOPHEN 5; 325 MG/1; MG/1
1 TABLET ORAL EVERY 4 HOURS PRN
Status: DISCONTINUED | OUTPATIENT
Start: 2022-12-15 | End: 2022-12-16 | Stop reason: HOSPADM

## 2022-12-15 RX ORDER — SODIUM CHLORIDE 0.9 % (FLUSH) 0.9 %
5-40 SYRINGE (ML) INJECTION PRN
Status: DISCONTINUED | OUTPATIENT
Start: 2022-12-15 | End: 2022-12-16 | Stop reason: HOSPADM

## 2022-12-15 RX ORDER — SODIUM CHLORIDE 0.9 % (FLUSH) 0.9 %
5-40 SYRINGE (ML) INJECTION EVERY 12 HOURS SCHEDULED
Status: DISCONTINUED | OUTPATIENT
Start: 2022-12-15 | End: 2022-12-15 | Stop reason: HOSPADM

## 2022-12-15 RX ORDER — PROCHLORPERAZINE EDISYLATE 5 MG/ML
5 INJECTION INTRAMUSCULAR; INTRAVENOUS
Status: DISCONTINUED | OUTPATIENT
Start: 2022-12-15 | End: 2022-12-15 | Stop reason: HOSPADM

## 2022-12-15 RX ORDER — ATENOLOL 50 MG/1
100 TABLET ORAL DAILY
Status: DISCONTINUED | OUTPATIENT
Start: 2022-12-16 | End: 2022-12-16 | Stop reason: HOSPADM

## 2022-12-15 RX ORDER — MORPHINE SULFATE 2 MG/ML
4 INJECTION, SOLUTION INTRAMUSCULAR; INTRAVENOUS
Status: DISCONTINUED | OUTPATIENT
Start: 2022-12-15 | End: 2022-12-16 | Stop reason: HOSPADM

## 2022-12-15 RX ORDER — HYDRALAZINE HYDROCHLORIDE 20 MG/ML
10 INJECTION INTRAMUSCULAR; INTRAVENOUS
Status: DISCONTINUED | OUTPATIENT
Start: 2022-12-15 | End: 2022-12-15 | Stop reason: HOSPADM

## 2022-12-15 RX ORDER — KETAMINE HCL IN NACL, ISO-OSM 20 MG/2 ML
SYRINGE (ML) INJECTION PRN
Status: DISCONTINUED | OUTPATIENT
Start: 2022-12-15 | End: 2022-12-15 | Stop reason: SDUPTHER

## 2022-12-15 RX ORDER — SODIUM CHLORIDE 9 MG/ML
25 INJECTION, SOLUTION INTRAVENOUS PRN
Status: DISCONTINUED | OUTPATIENT
Start: 2022-12-15 | End: 2022-12-15 | Stop reason: HOSPADM

## 2022-12-15 RX ORDER — CHLORTHALIDONE 25 MG/1
25 TABLET ORAL DAILY
Status: DISCONTINUED | OUTPATIENT
Start: 2022-12-16 | End: 2022-12-16 | Stop reason: HOSPADM

## 2022-12-15 RX ORDER — DEXAMETHASONE SODIUM PHOSPHATE 4 MG/ML
INJECTION, SOLUTION INTRA-ARTICULAR; INTRALESIONAL; INTRAMUSCULAR; INTRAVENOUS; SOFT TISSUE PRN
Status: DISCONTINUED | OUTPATIENT
Start: 2022-12-15 | End: 2022-12-15 | Stop reason: SDUPTHER

## 2022-12-15 RX ORDER — MEPERIDINE HYDROCHLORIDE 25 MG/ML
12.5 INJECTION INTRAMUSCULAR; INTRAVENOUS; SUBCUTANEOUS EVERY 5 MIN PRN
Status: DISCONTINUED | OUTPATIENT
Start: 2022-12-15 | End: 2022-12-15 | Stop reason: HOSPADM

## 2022-12-15 RX ORDER — SODIUM CHLORIDE 9 MG/ML
INJECTION, SOLUTION INTRAVENOUS PRN
Status: DISCONTINUED | OUTPATIENT
Start: 2022-12-15 | End: 2022-12-16 | Stop reason: HOSPADM

## 2022-12-15 RX ORDER — MORPHINE SULFATE 2 MG/ML
2 INJECTION, SOLUTION INTRAMUSCULAR; INTRAVENOUS
Status: DISCONTINUED | OUTPATIENT
Start: 2022-12-15 | End: 2022-12-16 | Stop reason: HOSPADM

## 2022-12-15 RX ORDER — DULOXETIN HYDROCHLORIDE 30 MG/1
30 CAPSULE, DELAYED RELEASE ORAL DAILY
Status: DISCONTINUED | OUTPATIENT
Start: 2022-12-16 | End: 2022-12-16 | Stop reason: HOSPADM

## 2022-12-15 RX ORDER — GLYCOPYRROLATE 0.2 MG/ML
INJECTION INTRAMUSCULAR; INTRAVENOUS PRN
Status: DISCONTINUED | OUTPATIENT
Start: 2022-12-15 | End: 2022-12-15 | Stop reason: SDUPTHER

## 2022-12-15 RX ORDER — SODIUM CHLORIDE 9 MG/ML
INJECTION, SOLUTION INTRAVENOUS PRN
Status: DISCONTINUED | OUTPATIENT
Start: 2022-12-15 | End: 2022-12-15 | Stop reason: HOSPADM

## 2022-12-15 RX ORDER — LIDOCAINE HYDROCHLORIDE 10 MG/ML
1 INJECTION, SOLUTION EPIDURAL; INFILTRATION; INTRACAUDAL; PERINEURAL
Status: DISCONTINUED | OUTPATIENT
Start: 2022-12-15 | End: 2022-12-15 | Stop reason: HOSPADM

## 2022-12-15 RX ORDER — FENTANYL CITRATE 50 UG/ML
INJECTION, SOLUTION INTRAMUSCULAR; INTRAVENOUS PRN
Status: DISCONTINUED | OUTPATIENT
Start: 2022-12-15 | End: 2022-12-15 | Stop reason: SDUPTHER

## 2022-12-15 RX ORDER — ATENOLOL AND CHLORTHALIDONE TABLET 100; 25 MG/1; MG/1
1 TABLET ORAL DAILY
Status: DISCONTINUED | OUTPATIENT
Start: 2022-12-15 | End: 2022-12-15 | Stop reason: SDUPTHER

## 2022-12-15 RX ADMIN — PROPOFOL 30 MG: 10 INJECTION, EMULSION INTRAVENOUS at 15:37

## 2022-12-15 RX ADMIN — SODIUM CHLORIDE, POTASSIUM CHLORIDE, SODIUM LACTATE AND CALCIUM CHLORIDE: 600; 310; 30; 20 INJECTION, SOLUTION INTRAVENOUS at 14:27

## 2022-12-15 RX ADMIN — GABAPENTIN 100 MG: 100 CAPSULE ORAL at 20:27

## 2022-12-15 RX ADMIN — PROPOFOL 10 MG: 10 INJECTION, EMULSION INTRAVENOUS at 15:51

## 2022-12-15 RX ADMIN — MIDAZOLAM HYDROCHLORIDE 2 MG: 2 INJECTION, SOLUTION INTRAMUSCULAR; INTRAVENOUS at 11:04

## 2022-12-15 RX ADMIN — DEXAMETHASONE SODIUM PHOSPHATE 4 MG: 4 INJECTION, SOLUTION INTRAMUSCULAR; INTRAVENOUS at 15:37

## 2022-12-15 RX ADMIN — FENTANYL CITRATE 100 MCG: 50 INJECTION, SOLUTION INTRAMUSCULAR; INTRAVENOUS at 15:02

## 2022-12-15 RX ADMIN — PHENYLEPHRINE HYDROCHLORIDE 100 MCG: 10 INJECTION, SOLUTION INTRAMUSCULAR; INTRAVENOUS; SUBCUTANEOUS at 15:43

## 2022-12-15 RX ADMIN — HYDROCODONE BITARTRATE AND ACETAMINOPHEN 2 TABLET: 5; 325 TABLET ORAL at 20:27

## 2022-12-15 RX ADMIN — GLYCOPYRROLATE 0.1 MG: 0.2 INJECTION INTRAMUSCULAR; INTRAVENOUS at 15:45

## 2022-12-15 RX ADMIN — Medication 100 MG: at 13:07

## 2022-12-15 RX ADMIN — RIVAROXABAN 10 MG: 10 TABLET, FILM COATED ORAL at 18:15

## 2022-12-15 RX ADMIN — ONDANSETRON 4 MG: 2 INJECTION INTRAMUSCULAR; INTRAVENOUS at 15:37

## 2022-12-15 RX ADMIN — PROPOFOL 20 MG: 10 INJECTION, EMULSION INTRAVENOUS at 15:42

## 2022-12-15 RX ADMIN — GLYCOPYRROLATE 0.1 MG: 0.2 INJECTION INTRAMUSCULAR; INTRAVENOUS at 15:52

## 2022-12-15 RX ADMIN — PROPOFOL 325 MG: 10 INJECTION, EMULSION INTRAVENOUS at 13:07

## 2022-12-15 RX ADMIN — BUPIVACAINE HYDROCHLORIDE 30 ML: 5 INJECTION, SOLUTION EPIDURAL; INTRACAUDAL; PERINEURAL at 11:04

## 2022-12-15 RX ADMIN — VANCOMYCIN HYDROCHLORIDE 2500 MG: 10 INJECTION, POWDER, LYOPHILIZED, FOR SOLUTION INTRAVENOUS at 13:00

## 2022-12-15 RX ADMIN — PROPOFOL 40 MG: 10 INJECTION, EMULSION INTRAVENOUS at 15:28

## 2022-12-15 RX ADMIN — SODIUM CHLORIDE, POTASSIUM CHLORIDE, SODIUM LACTATE AND CALCIUM CHLORIDE: 600; 310; 30; 20 INJECTION, SOLUTION INTRAVENOUS at 11:29

## 2022-12-15 RX ADMIN — Medication 160 MG: at 13:07

## 2022-12-15 RX ADMIN — PROPOFOL 30 MG: 10 INJECTION, EMULSION INTRAVENOUS at 15:25

## 2022-12-15 RX ADMIN — Medication 20 MG: at 13:38

## 2022-12-15 RX ADMIN — FENTANYL CITRATE 100 MCG: 50 INJECTION, SOLUTION INTRAMUSCULAR; INTRAVENOUS at 11:04

## 2022-12-15 RX ADMIN — CLINDAMYCIN PHOSPHATE 900 MG: 900 INJECTION, SOLUTION INTRAVENOUS at 20:31

## 2022-12-15 RX ADMIN — SODIUM CHLORIDE: 9 INJECTION, SOLUTION INTRAVENOUS at 18:05

## 2022-12-15 RX ADMIN — PROPOFOL 10 MG: 10 INJECTION, EMULSION INTRAVENOUS at 15:47

## 2022-12-15 ASSESSMENT — PAIN - FUNCTIONAL ASSESSMENT: PAIN_FUNCTIONAL_ASSESSMENT: 0-10

## 2022-12-15 ASSESSMENT — PAIN SCALES - GENERAL
PAINLEVEL_OUTOF10: 0

## 2022-12-15 ASSESSMENT — ENCOUNTER SYMPTOMS: SHORTNESS OF BREATH: 0

## 2022-12-15 ASSESSMENT — LIFESTYLE VARIABLES: SMOKING_STATUS: 0

## 2022-12-15 NOTE — ANESTHESIA PRE PROCEDURE
Department of Anesthesiology  Preprocedure Note       Name:  Marisela Perry   Age:  65 y.o.  :  1960                                          MRN:  [id number]         Date:  12/15/2022      Surgeon: Hollie Zuniga):  Maninder Park MD    Procedure: Procedure(s):  LEFT SUBTALAR JOINT ARTHRODESIS, TALONAVICULAR JOINT ARTHRODESIS, POSSIBLE TRIPLE ARTHRODESIS, GASTROCNEMIUS RECESSION    Medications prior to admission:   Prior to Admission medications    Medication Sig Start Date End Date Taking? Authorizing Provider   Naproxen Sodium (ALEVE) 220 MG CAPS Aleve 220 mg capsule   Take by oral route. Historical Provider, MD   aspirin 325 MG EC tablet Take 1 tablet by mouth 2 times daily (before meals) 10/29/21   ROXANNA Weber   DULoxetine (CYMBALTA) 30 MG extended release capsule Take 30 mg by mouth daily 10/18/21   Historical Provider, MD   atenolol-chlorthalidone (TENORETIC) 100-25 MG per tablet Take 1 tablet by mouth daily    Historical Provider, MD Palencia Braver 1000 MG CAPS Take by mouth    Historical Provider, MD   Multiple Vitamin (MULTIVITAMIN ADULT PO) Take by mouth    Historical Provider, MD       Current medications:    No current facility-administered medications for this visit. No current outpatient medications on file.      Facility-Administered Medications Ordered in Other Visits   Medication Dose Route Frequency Provider Last Rate Last Admin    lactated ringers infusion   IntraVENous Continuous Maninder Park MD        vancomycin (VANCOCIN) 2,500 mg in dextrose 5 % 500 mL IVPB  15 mg/kg IntraVENous Once Maninder Park MD        lidocaine PF 1 % injection 1 mL  1 mL IntraDERmal Once PRN Ilya Mascorro MD        lactated ringers infusion   IntraVENous Continuous Ilya Mascorro MD        sodium chloride flush 0.9 % injection 5-40 mL  5-40 mL IntraVENous 2 times per day Ilya Mascorro MD        sodium chloride flush 0.9 % injection 5-40 mL  5-40 mL IntraVENous PRN Pao Soto MD        0.9 % sodium chloride infusion   IntraVENous PRN Pao Soto MD           Allergies: Allergies   Allergen Reactions    Pcn [Penicillins] Hives and Rash       Problem List:    Patient Active Problem List   Diagnosis Code    Arthritis of right foot M19.071       Past Medical History:        Diagnosis Date    Arthritis     Hypertension        Past Surgical History:        Procedure Laterality Date    ANKLE SURGERY Right 10/28/2021    RIGHT: SUBTALAR JOINT ARTHRODESIS, TALONAVICULAR JOINT ARTHRODESIS, , GASTROCNEMIUS RECESSION performed by Gail Castellanos MD at [facility] 15 Bilateral     HIP    LEG SURGERY Right        Social History:    Social History     Tobacco Use    Smoking status: Some Days     Types: Cigars    Smokeless tobacco: Never    Tobacco comments:     NO CIGAR IN MONTHS - WILL SMOKE ONE FEW TIMES YEAR   Substance Use Topics    Alcohol use: Yes     Comment: 1-2 / MONTH                                Ready to quit: Not Answered  Counseling given: Not Answered  Tobacco comments: NO CIGAR IN MONTHS - WILL SMOKE ONE FEW TIMES YEAR      Vital Signs (Current): There were no vitals filed for this visit. BP Readings from Last 3 Encounters:   10/29/21 (!) 171/94   10/28/21 (!) 122/58       NPO Status:                                                                                 BMI:   Wt Readings from Last 3 Encounters:   12/14/22 (!) 375 lb (170.1 kg)   10/28/21 (!) 350 lb (158.8 kg)     There is no height or weight on file to calculate BMI.    CBC: No results found for: WBC, RBC, HGB, HCT, MCV, RDW, PLT    CMP: No results found for: NA, K, CL, CO2, BUN, CREATININE, GFRAA, AGRATIO, LABGLOM, GLUCOSE, GLU, PROT, CALCIUM, BILITOT, ALKPHOS, AST, ALT    POC Tests: No results for input(s): POCGLU, POCNA, POCK, POCCL, POCBUN, POCHEMO, POCHCT in the last 72 hours.     Coags: No results found for: PROTIME, INR, APTT    HCG (If Applicable): No results found for: PREGTESTUR, PREGSERUM, HCG, HCGQUANT     ABGs: No results found for: PHART, PO2ART, GRX0BPK, TBA3AQZ, BEART, D7XNEEVV     Type & Screen (If Applicable):  No results found for: LABABO, LABRH    Drug/Infectious Status (If Applicable):  No results found for: HIV, HEPCAB    COVID-19 Screening (If Applicable): No results found for: COVID19        Anesthesia Evaluation  Patient summary reviewed and Nursing notes reviewed no history of anesthetic complications:   Airway: Mallampati: IV     Neck ROM: full  Mouth opening: > = 3 FB   Dental: normal exam         Pulmonary:normal exam        (-) COPD, asthma, shortness of breath, sleep apnea and not a current smoker                           Cardiovascular:  Exercise tolerance: good (>4 METS),   (+) hypertension:,     (-) past MI and  angina      Rhythm: regular  Rate: normal           Beta Blocker:  Dose within 24 Hrs         Neuro/Psych:      (-) seizures           GI/Hepatic/Renal:   (+) morbid obesity     (-) GERD       Endo/Other:        (-) diabetes mellitus               Abdominal:   (+) obese,           Vascular: Other Findings:             Anesthesia Plan      general and regional     ASA 3     (Patient denies using blood thinners and denies any neurologic issues in the lower extremity. Discussed risk/benefit and I feel he is a good candidate for peripheral nerve block)  Induction: intravenous. MIPS: Postoperative opioids intended and Prophylactic antiemetics administered. Anesthetic plan and risks discussed with patient. Plan discussed with CRNA and attending.                     Kat Buitrago DO   12/15/2022

## 2022-12-15 NOTE — DISCHARGE INSTRUCTIONS
[address]  [phone]    POSTOPERATIVE INSTRUCTIONS    - For the first 48 hours after your surgery, rest and elevate the surgical area as much as possible. - Application of an ice pack to the area is helpful. A small amount of drainage or swelling may be expected. - Side effects to anesthesia may include nausea, lightheadedness, coughing, sore throat, and/or muscle aches. Rest, fluids, and light foods can help. Please call our office if:      - Your hand or foot becomes numb, blue, or cold. It is normal to experience numbness 12 to 24 hrs after surgery if you did receive a nerve block    - You have severe pain or burning which is not relieved with your pain medication.     - Your incision has become reddened or swollen, painful or has excessive bleeding or foul smelling drainage. You should have your first post-op appointment scheduled for tomorrow. Please check your surgery packet for date and time of this appointment. You have been given prescriptions for: Norco.  This was e-prescribed to the [address] Take Xarelto 10 mg daily until instructed to discontinue this medication for DVT prophylaxis. If your lower extremity was operated on:    Crutches/Roll-about/Wheelchair as needed to aid in ambulation. You are to remain non-weight bearing on your operative extremity. You may loosen your Ace wrap: as necessary for pain control    Your dressings will be changed at your first post-op visit. It is ok to shower 2 days after surgery, but you must keep your dressings clean and dry.

## 2022-12-15 NOTE — ANESTHESIA POSTPROCEDURE EVALUATION
Department of Anesthesiology  Postprocedure Note    Patient: Junito Solis  MRN: [id number]  YOB: 1960  Date of evaluation: 12/15/2022      Procedure Summary     Date: 12/15/22 Room / Location: [facility]    Anesthesia Start: 1300 Anesthesia Stop: 4940    Procedure: LEFT SUBTALAR JOINT ARTHRODESIS, TALONAVICULAR JOINT ARTHRODESIS, GASTROCNEMIUS RECESSION (Left: Foot) Diagnosis:       Primary osteoarthritis of left foot      Short Achilles tendon, left      (Primary osteoarthritis of left foot [M19.072])      (Short Achilles tendon, left [M67.02])    Surgeons: Crys Arreaga MD Responsible Provider: Lily Kasper DO    Anesthesia Type: general, regional ASA Status: 3          Anesthesia Type: No value filed.     Xochitl Phase I: Xochitl Score: 10    Xochitl Phase II:        Anesthesia Post Evaluation    Patient location during evaluation: PACU  Patient participation: complete - patient participated  Level of consciousness: awake and alert  Pain score: 0  Airway patency: patent  Nausea & Vomiting: no nausea and no vomiting  Cardiovascular status: blood pressure returned to baseline  Respiratory status: acceptable  Hydration status: euvolemic

## 2022-12-15 NOTE — PROGRESS NOTES
Pt waiting for ready bed in 5524 and bedside report. Dinner Tray ordered per pt request.     [facility] Vitals changed to every half hour and Head - to - toe assessments changed to every 2 hours.

## 2022-12-15 NOTE — ANESTHESIA PROCEDURE NOTES
Peripheral Block    Patient location during procedure: pre-op  Reason for block: procedure for pain, post-op pain management and at surgeon's request  Start time: 12/15/2022 11:04 AM  End time: 12/15/2022 11:15 AM  Staffing  Performed: anesthesiologist   Anesthesiologist: Flonnie Aschoff, DO  Preanesthetic Checklist  Completed: patient identified, IV checked, site marked, risks and benefits discussed, surgical/procedural consents, equipment checked, pre-op evaluation, timeout performed, anesthesia consent given, oxygen available, monitors applied/VS acknowledged, fire risk safety assessment completed and verbalized and blood product R/B/A discussed and consented  Peripheral Block   Patient position: supine  Prep: ChloraPrep  Provider prep: mask  Patient monitoring: cardiac monitor, continuous pulse ox, continuous capnometry, frequent blood pressure checks, IV access, oxygen and responsive to questions  Block type: Femoral  Adductor canal  Laterality: left  Injection technique: single-shot  Guidance: ultrasound guided    Needle   Needle type: insulated echogenic nerve stimulator needle   Needle gauge: 20 G  Needle localization: ultrasound guidance  Needle length: 8 cm  Assessment   Injection assessment: negative aspiration for heme, no paresthesia on injection, local visualized surrounding nerve on ultrasound and no intravascular symptoms  Paresthesia pain: none  Slow fractionated injection: yes  Hemodynamics: stable  Real-time US image taken/store: yes  Outcomes: uncomplicated and patient tolerated procedure well    Additional Notes  0.5% bupi-10mL

## 2022-12-15 NOTE — ANESTHESIA PROCEDURE NOTES
Peripheral Block    Patient location during procedure: pre-op  Reason for block: procedure for pain, post-op pain management and at surgeon's request  Start time: 12/15/2022 11:04 AM  End time: 12/15/2022 11:15 AM  Staffing  Performed: anesthesiologist   Anesthesiologist: Ivonne Talamantes DO  Preanesthetic Checklist  Completed: patient identified, IV checked, site marked, risks and benefits discussed, surgical/procedural consents, equipment checked, pre-op evaluation, timeout performed, anesthesia consent given, oxygen available, monitors applied/VS acknowledged, fire risk safety assessment completed and verbalized and blood product R/B/A discussed and consented  Peripheral Block   Patient position: supine  Prep: ChloraPrep  Provider prep: mask  Patient monitoring: cardiac monitor, continuous pulse ox, continuous capnometry, frequent blood pressure checks, IV access, oxygen and responsive to questions  Block type: Sciatic  Popliteal  Laterality: left  Injection technique: single-shot  Guidance: ultrasound guided    Needle   Needle type: insulated echogenic nerve stimulator needle   Needle gauge: 20 G  Needle localization: ultrasound guidance  Needle length: 8 cm  Assessment   Injection assessment: negative aspiration for heme, no paresthesia on injection, local visualized surrounding nerve on ultrasound and no intravascular symptoms  Paresthesia pain: none  Slow fractionated injection: yes  Hemodynamics: stable  Real-time US image taken/store: yes  Outcomes: uncomplicated and patient tolerated procedure well    Additional Notes  0.5% bupi-20mL

## 2022-12-15 NOTE — PROGRESS NOTES
Procedure(s):  LEFT SUBTALAR JOINT ARTHRODESIS, TALONAVICULAR JOINT ARTHRODESIS, GASTROCNEMIUS RECESSION    Current Allergies: Pcn [penicillins]    Recent Labs     12/15/22  1048   POCGLU 151*       Admitted to PACU bed 13 from OR. Arrived on a stretcher . Attached to PACU monitoring system. Alarms and parameters set. Report received from anesthesia personnel. OR staff did not report skin issues that were observed while in OR  Pt arrived with oxygen per non-rebreather face mask with oxygen at 8 liters. Athrombic wraps in place.

## 2022-12-15 NOTE — PROGRESS NOTES
4 Eyes Admission Assessment     I agree as the admission nurse that 2 RN's have performed a thorough Head to Toe Skin Assessment on the patient. ALL assessment sites listed below have been assessed on admission. Areas assessed by both nurses:   [x]   Head, Face, and Ears   [x]   Shoulders, Back, and Chest  [x]   Arms, Elbows, and Hands   [x]   Coccyx, Sacrum, and Ischium  [x]   Legs, Feet, and Heels        Does the Patient have Skin Breakdown?    Blanchable redness           Lamberto Prevention initiated:  No   Wound Care Orders initiated:  No      [facility] nurse consulted for Pressure Injury (Stage 3,4, Unstageable, DTI, NWPT, and Complex wounds) or Lamberto score 18 or lower:  No      Nurse 1 eSignature: Electronically signed by Guillermo Goldberg RN on 12/15/22 at 5:57 PM EST    **SHARE this note so that the co-signing nurse is able to place an eSignature**    Nurse 2 eSignature: Electronically signed by Catrachito Betancourt RN on 12/15/22 at 6:07 PM EST

## 2022-12-15 NOTE — PROGRESS NOTES
PACU Transfer to Floor Note    Procedure(s):  LEFT SUBTALAR JOINT ARTHRODESIS, TALONAVICULAR JOINT ARTHRODESIS, GASTROCNEMIUS RECESSION    Current Allergies: Pcn [penicillins]    Pt meets criteria as per Pascale Score and ASPAN Standards to transfer to next phase of care. Recent Labs     12/15/22  1048   POCGLU 151*       Vitals:    12/15/22 1730   BP: 139/74   Pulse: 70   Resp: 12   Temp: 97.2 °F (36.2 °C)   SpO2: 95%     Vitals within 20% of pt's admission vitals as per PASCALE SCORE    SpO2: 95 %    O2 Flow Rate (L/min): 0 L/min      Intake/Output Summary (Last 24 hours) at 12/15/2022 1742  Last data filed at 12/15/2022 1703  Gross per 24 hour   Intake 1950 ml   Output 20 ml   Net 1930 ml       Pain assessment:  none    Pain Level: 0    Patient was assessed for alterations to skin integrity. There were not alterations observed. Is patient incontinent: no    Handoff report given at bedside.    Family updated and directed to pt room      12/15/2022 5:42 PM

## 2022-12-15 NOTE — H&P
[address] Same Day Surgery Update H & P  Department of General Surgery   Surgical Service   Pre-operative History and Physical  Last H & P within the last 30 days. DIAGNOSIS:   Primary osteoarthritis of left foot [M19.072]  Short Achilles tendon, left [M67.02]    Procedure(s):  LEFT SUBTALAR JOINT ARTHRODESIS, TALONAVICULAR JOINT ARTHRODESIS, POSSIBLE TRIPLE ARTHRODESIS, GASTROCNEMIUS RECESSION     History obtained from: Patient interview and EHR     HISTORY OF PRESENT ILLNESS:   Patient is a morbidly obese (Body mass index is 44.37 kg/m².), 65 y.o. male with c/o left foot/ankle pain and swelling in the setting of arthrosis. Their symptoms have been recalcitrant to conservative treatment and the patient presents today for the above procedure. Illness Screening: Patient denies fever, chills, worsening cough, or close contact with sick individuals. Past Medical History:        Diagnosis Date    Arthritis     Hypertension      Past Surgical History:        Procedure Laterality Date    ANKLE SURGERY Right 10/28/2021    RIGHT: SUBTALAR JOINT ARTHRODESIS, TALONAVICULAR JOINT ARTHRODESIS, , GASTROCNEMIUS RECESSION performed by Shelbi Steele MD at [address] [place] [address]      15 years ago, piece of colon removed    JOINT REPLACEMENT Bilateral     HIP    LEG SURGERY Right            Medications Prior to Admission:      Prior to Admission medications    Medication Sig Start Date End Date Taking? Authorizing Provider   gabapentin (NEURONTIN) 100 MG capsule Take 100 mg by mouth in the morning and at bedtime. 12/6/22   Historical Provider, MD   Naproxen Sodium 220 MG CAPS Aleve 220 mg capsule   Take by oral route.     Historical Provider, MD   DULoxetine (CYMBALTA) 30 MG extended release capsule Take 30 mg by mouth daily 10/18/21   Historical Provider, MD   atenolol-chlorthalidone (TENORETIC) 100-25 MG per tablet Take 1 tablet by mouth daily    Historical Provider, MD Chevy Small Oil 1000 MG CAPS Take by mouth    Historical Provider, MD   Multiple Vitamin (MULTIVITAMIN ADULT PO) Take by mouth    Historical Provider, MD         Allergies:  Pcn [penicillins]    PHYSICAL EXAM:      BP (!) 172/111   Pulse 69   Temp 97.4 °F (36.3 °C) (Temporal)   Resp 16   Ht 6' 3\" (1.905 m)   Wt (!) 355 lb (161 kg)   SpO2 96%   BMI 44.37 kg/m²      Airway:  Airway patent with no audible stridor    Heart:  Regular rate and rhythm, No murmur noted    Lungs:  No increased work of breathing, good air exchange, clear to auscultation bilaterally, no crackles or wheezing    Abdomen:  Soft, non-distended, non-tender, no masses palpated    ASSESSMENT AND PLAN    Patient is a 65 y.o. male with above specified procedure planned. 1.  The patients history and physical was obtained and signed off by the pre-admission testing department. Patient seen and focused exam done today- no new changes since last physical exam on 11/28/22    2. Access to ancillary services are available per request of the provider.     PHAN Castano - CNP     12/15/2022

## 2022-12-15 NOTE — BRIEF OP NOTE
Brief Postoperative Note      Patient: Rosario John  YOB: 1960  MRN: [id number]    Date of Procedure: 12/15/2022    Pre-Op Diagnosis: Primary osteoarthritis of left foot [M19.072]  Short Achilles tendon, left [M67.02]    Post-Op Diagnosis: Same       Procedure(s):  LEFT SUBTALAR JOINT ARTHRODESIS, TALONAVICULAR JOINT ARTHRODESIS, GASTROCNEMIUS RECESSION    Surgeon(s):  Renato Hamilton MD    Assistant:  Surgical Assistant: Sujata Gutierrez  First Assistant: ROXANNA Bedoya    Anesthesia: General    Estimated Blood Loss (mL): less than 50     Complications: None    Specimens:   * No specimens in log *    Implants:  * No implants in log *      Drains: * No LDAs found *    Findings: See Above.      Electronically signed by Renato Hamilton MD on 12/15/2022 at 3:36 PM

## 2022-12-15 NOTE — PROGRESS NOTES
Pt tolerating ice chips. Sitting up in bed on 1.5L NC. BP elevated upon arrival to PACU but has since decreased to 137/76 (BP noted to be elevated in pre op). Ice applied to LLE and elevated with pillow. He denies pain in surg leg and reports decreased sensation d/t pain block. He is able to move LLE minimally at this time. Spouse updated on pt's status and his new room number. Floor RN busy at this time, but will give report when RN is available. Room is still being cleaned.

## 2022-12-15 NOTE — PROGRESS NOTES
Pt is resting in bed. Call light in reach. Wife at bedside. 2 belonging bags to go to locked room. Vancomycin on hold to OR. Block performed in preop. Procedure: peripheral block  MD: Dr. Leeann Painting performed. Pt monitored closely on heart monitor, 2L NC, continuous pulse oximetry, EtCO2, and frequent BPs. Pt remained alert and oriented x4. pt tolerated procedure well.

## 2022-12-16 VITALS
TEMPERATURE: 98.2 F | HEIGHT: 75 IN | DIASTOLIC BLOOD PRESSURE: 89 MMHG | BODY MASS INDEX: 39.17 KG/M2 | RESPIRATION RATE: 16 BRPM | OXYGEN SATURATION: 98 % | HEART RATE: 80 BPM | SYSTOLIC BLOOD PRESSURE: 163 MMHG | WEIGHT: 315 LBS

## 2022-12-16 LAB
ALBUMIN SERPL-MCNC: 4 G/DL (ref 3.4–5)
ANION GAP SERPL CALCULATED.3IONS-SCNC: 9 MMOL/L (ref 3–16)
BUN BLDV-MCNC: 18 MG/DL (ref 7–20)
CALCIUM SERPL-MCNC: 9.3 MG/DL (ref 8.3–10.6)
CHLORIDE BLD-SCNC: 100 MMOL/L (ref 99–110)
CO2: 27 MMOL/L (ref 21–32)
CREAT SERPL-MCNC: 1 MG/DL (ref 0.8–1.3)
GFR SERPL CREATININE-BSD FRML MDRD: >60 ML/MIN/{1.73_M2}
GLUCOSE BLD-MCNC: 166 MG/DL (ref 70–99)
PHOSPHORUS: 3.3 MG/DL (ref 2.5–4.9)
POTASSIUM SERPL-SCNC: 3.9 MMOL/L (ref 3.5–5.1)
SODIUM BLD-SCNC: 136 MMOL/L (ref 136–145)

## 2022-12-16 PROCEDURE — 2500000003 HC RX 250 WO HCPCS: Performed by: ORTHOPAEDIC SURGERY

## 2022-12-16 PROCEDURE — 6370000000 HC RX 637 (ALT 250 FOR IP): Performed by: ORTHOPAEDIC SURGERY

## 2022-12-16 PROCEDURE — G0378 HOSPITAL OBSERVATION PER HR: HCPCS

## 2022-12-16 PROCEDURE — 2580000003 HC RX 258: Performed by: ORTHOPAEDIC SURGERY

## 2022-12-16 RX ORDER — HYDROCODONE BITARTRATE AND ACETAMINOPHEN 5; 325 MG/1; MG/1
1-2 TABLET ORAL
Qty: 40 TABLET | Refills: 0 | COMMUNITY
Start: 2022-12-16 | End: 2022-12-23

## 2022-12-16 RX ADMIN — DULOXETINE HYDROCHLORIDE 30 MG: 30 CAPSULE, DELAYED RELEASE ORAL at 10:03

## 2022-12-16 RX ADMIN — CLINDAMYCIN PHOSPHATE 900 MG: 900 INJECTION, SOLUTION INTRAVENOUS at 02:54

## 2022-12-16 RX ADMIN — HYDROCODONE BITARTRATE AND ACETAMINOPHEN 2 TABLET: 5; 325 TABLET ORAL at 03:00

## 2022-12-16 RX ADMIN — GABAPENTIN 100 MG: 100 CAPSULE ORAL at 10:03

## 2022-12-16 RX ADMIN — SODIUM CHLORIDE: 9 INJECTION, SOLUTION INTRAVENOUS at 02:53

## 2022-12-16 NOTE — PROGRESS NOTES
Ortho Progress Note    POD 1 s/p double arthrodesis and gastrocnemius recession. He has no complaints this morning. He states that he practiced staying off of his foot prior to surgery and does not feel like he needs therapy prior to discharge. Dressings clean, dry and intact. Good capillary refill in all digits. Block partially resolved. Continue NWB on operative extremity. Xarelto for DVT prophylaxis. D/c to home today. His pain medication prescription was sent to the [address]

## 2022-12-16 NOTE — PROGRESS NOTES
Pt discharged home with all belongings and all questions answered. IV removed, education complete. Pt wife here for transport home.

## 2022-12-16 NOTE — DISCHARGE SUMMARY
[facility] DISCHARGE SUMMARY         The patient was taken to the operating room on 12/15/2022 where the aforementioned procedure was preformed. The patient was taken to the post operative anesthesia recovery unit in stable condition. The patient was then transferred to the orthopaedic floor for post operative pain management and convalesce       The patient was followed medically in the hospital for the above surgical procedures performed and below medical issues during their hospital stay    Principal Problem:    Arthritis of left foot  Resolved Problems:    * No resolved hospital problems. *         (x )The patient was placed on anticoagulation therapy for DVT prophylaxis -- Xalreto 10 mg      The patient was discharged in stable condition on 12/16/2022. Please see medical reconciliation for discharge medications. The discharge instructions were explained to the patient and the family. The patient will follow up in the office at his scheduled post-op appointments.

## 2022-12-16 NOTE — PROGRESS NOTES
Patient A&Ox4 . VSS with an exception of elevated BP. No numbness or tingling. Pain is managed per MAR. Voiding via urinal. Attempted multiple time to ambulate patient  but patient refused. Tolerating fluids. All fall precaution in place. Will continue to monitor.

## 2022-12-16 NOTE — PLAN OF CARE
Problem: Discharge Planning  Goal: Discharge to home or other facility with appropriate resources  12/16/2022 0800 by Rody Ann RN  Outcome: Progressing     Problem: Pain  Goal: Verbalizes/displays adequate comfort level or baseline comfort level  12/16/2022 0800 by Rody Ann RN  Outcome: Progressing     Problem: Safety - Adult  Goal: Free from fall injury  12/16/2022 0800 by Rody Ann RN  Outcome: Progressing     Problem: ABCDS Injury Assessment  Goal: Absence of physical injury  12/16/2022 0800 by Rody Ann RN  Outcome: Progressing

## 2022-12-17 NOTE — OP NOTE
[facility] [address] [address], [address]                                OPERATIVE REPORT    PATIENT NAME: Jacklyn Moya                   :        1960  MED REC NO:   [id number]                          ROOM:       5524  ACCOUNT NO:   [de-identified]                           ADMIT DATE: 12/15/2022  PROVIDER:     Kayla Flannery. Prasad Yi MD    DATE OF PROCEDURE:  12/15/2022    PREOPERATIVE DIAGNOSIS:  Left,  1. 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 3.  Equinus contracture. POSTOPERATIVE DIAGNOSES:  Left,  1. 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 3.  Equinus contracture. OPERATIONS PERFORMED: Left,  1. Subtalar joint arthrodesis. 2.  Talonavicular joint arthrodesis. 3.  Gastrocnemius recession. SURGEON:  Kayla Flannery. Prasad Yi MD    SECOND SURGEON:  Juan Antonio Pittman PA-C    ANESTHESIA:  General with block. INDICATIONS:  This is a 76-year-old gentleman with a history of  significant pain and discomfort in his left hindfoot. The patient has  continued to have intractable pain despite conservative management, has  elected for surgical treatment. The risks and potential benefits of the  procedure were discussed with the patient. He understands these, was  given the opportunity to ask questions. His questions were answered to  his satisfaction. He has given consent to proceed with the above  outlined procedures. Second surgeon for this case is Juan Antonio Pittman PA-C.    OPERATIVE PROCEDURE:  The patient was brought to the operating room,  placed in the supine position on the operating table. After induction  of general anesthetic, a pneumatic tourniquet was placed on the  patient's left proximal thigh and set to 350 mmHg. Left leg was then  prepped and draped free in the usual sterile fashion.   The patient was  turned to a semi-lateral decubitus position on a beanbag patient  positioner. A gel axillary roll was applied and all prominences were  well padded. The arms were carefully positioned by the Anesthesia team  and the patient's left leg was prepped and draped free in the usual  sterile fashion. A second surgeon was necessary throughout the procedure due to the  patient's increased size and body mass index. This increased the  overall technical complexity of the procedure. A second surgeon was  necessary to aid with appropriate positioning of the patient and  positioning the extremity throughout the procedure. A second surgeon  was necessary to decrease overall operative time and to improve the  patient's safety and outcome. A second surgeon was necessary to aid  with appropriate identification and protection of neurologic and  vascular structures in the scarred soft tissue envelope. An assistant  with these skills was not available from the hospital at the time of the  procedure necessitating Sherif Oleary presence. Eliza Closs was present  for and participated in all integral parts of the procedure. Gastrocnemius recession. At this point, an intraoperative Silfverskiold  test was performed. This revealed a significant equinus contracture. Incision was therefore made in line with the axis of extremity, centered  over the distal aponeurosis of the gastrocnemius muscle. Blunt  dissection was carried out through the subcutaneous and deeper tissue  taking care to identify and protect the sural nerve and lesser saphenous  vein. These structures were gently retracted and protected and the  aponeurosis of the gastrocnemius muscle identified. The aponeurosis was  divided transversely under direct visualization allowing approximately 4  cm of lengthening and nicely correcting the patient's underlying equinus  contracture. This wound was irrigated with a sterile lavage solution  and closed with 3-0 nylon suture. Subtalar joint arthrodesis.   At this point, the extremity was  exsanguinated with the Esmarch tourniquet, the pneumatic tourniquet  inflated. Incision was made from the tip of the fibula towards the base  of the fourth metatarsal and dissection carried out through the  subcutaneous and deeper tissue. The tarsal sinus was entered and the  subtalar joint distracted. Visualization of the joint revealed  significant arthritis of the posterior middle and anterior facets of the  subtalar joint. In addition, the calcaneus had subluxed laterally and  the false joint had been formed between the tip of the fibula and the  calcaneus. The peroneal tendons were inspected and noted to be intact. At this point, the joint was prepared for arthrodesis with an AO chisel. This was used to remove articular cartilage from the posterior middle  and anterior facets of the subtalar joint. This also allowed good  correction of the subtalar joint which was spread laterally with a  laminar . Final positioning and fixation of the fusion were  done after preparation of the talonavicular joint fusion. Talonavicular joint arthrodesis. At this point, a laminar  was  placed between the anterior process of the calcaneus and the lateral  shoulder of the talus. This allowed the talonavicular joint to be  exposed laterally and the joint was prepared for arthrodesis with a 5-mm  bur, AO chisel, and curettes. All articular cartilage and subchondral  bone were removed exposing healthy cancellous bone proximally and  distally. A second incision at this point was made along the medial  aspect of the talonavicular joint and subperiosteal dissection performed  superiorly and inferiorly. Large bossing osteophytes were appreciated  which were removed with a rongeur. The joint was prepared for  arthrodesis after distraction with a Hintermann type distractor and its  medial half with the curette, bur, and chisels.   Once all fusion  surfaces were exposed to healthy-appearing cancellous bone, positioning  was achieved manually. The calcaneus was placed in the desired  corrected position of approximately 7 degrees of valgus from a  preoperative deformity of 25 degrees of valgus. This was pinned with  two guidewires for the [employer] 6.5 mm hindfoot arthrodesis set  and two screws were applied in compression mode from the calcaneal tuber  into the talar body and neck. At this point, the talonavicular joint  was placed in the desired corrected position to restore longitudinal  arch height and correct forefoot abduction. This was pinned with a  K-wire, then checked clinically and with multiplanar fluoroscopy. The  fusion site was then compressed with an [employer] IO fix 5-mm  post and screw construct and a Huddle/Keemotion four-pronged nitinol  compression staple. These afforded rigid and excellent compression and  stability of the fusion sites. The wounds at this point were irrigated  with sterile lavage solution. Bone grafting was done with a combination  of Isacc Select demineralized bone matrix which was packed throughout  the fusion sites and an XXS Infuse sponge which had been prepared per  's recommendations and was again packed throughout the  fusion sites. All wounds were again irrigated and closed in layers. The talonavicular joint capsule and extensor digitorum brevis muscle and  tarsal sinus fat pad were reapproximated with 2-0 PDS suture. The  subcutaneous tissues were reapproximated with 3-0 Vicryl suture. The  skin edges were reapproximated with interrupted 3-0 nylon. There were no drains and no complications. Sponge and needle counts  were noted to be correct throughout the procedure by the nursing staff. Multiplanar fluoroscopy was utilized throughout the procedure and I  personally operated and supervised the use of the multiplanar  fluoroscopy unit.   Following closure and application of dressings, the  patient was awoken from his anesthetic and transferred to the [address] [place] [address] Unit. Estimated blood loss was minimal due to use of a  tourniquet.         Dylan Dunham MD    D: 12/16/2022 14:09:21       T: 12/16/2022 14:11:43     [email or web address]  Job#: [id number]     Doc#: [id number]    CC:

## 2024-06-12 NOTE — CONSULTS
Body Location Override (Optional - Billing Will Still Be Based On Selected Body Map Location If Applicable): left nasal tip
Consult received, no acute needs, primary team planning on pt discharge, discussed with RN. Please re consult if patient status changes.
Detail Level: Detailed
Add 40402 Cpt? (Important Note: In 2017 The Use Of 20362 Is Being Tracked By Cms To Determine Future Global Period Reimbursement For Global Periods): no

## (undated) DEVICE — THIN OFFSET (13.3 X 0.38 X 42.0MM)

## (undated) DEVICE — SUTURE VCRL SZ 3-0 L27IN ABSRB UD L26MM SH 1/2 CIR J416H

## (undated) DEVICE — GLOVE SURG SZ 85 L1185IN FNGR THK75MIL STRW LTX POLYMER

## (undated) DEVICE — SUTURE PDS II SZ 2-0 L27IN ABSRB VLT L26MM CT-2 1/2 CIR Z333H

## (undated) DEVICE — COUNTERSINK SURG SM

## (undated) DEVICE — COUNTERSINK SURG SM DISP FOR IO FIX X-POST IOFIX 2.0

## (undated) DEVICE — BANDAGE COBAN 4 IN COMPR W4INXL5YD FOAM COHESIVE QUIK STK SELF ADH SFT

## (undated) DEVICE — SYRINGE MED 10ML LUERLOCK TIP W/O SFTY DISP

## (undated) DEVICE — Device

## (undated) DEVICE — CARBIDE BUR,ROUND CUTTING, 8 FLUTES, MED., 4MM DIA.: Brand: MICROAIRE®

## (undated) DEVICE — BIT DRL DIA3MM CANN DISP FOR IO FIX IOFIX 2.0

## (undated) DEVICE — KIT DRL TMPLT 15MM 18MM 20MM 25MM 30MM CORRESPONDING

## (undated) DEVICE — SPLINT ORTH W5XL30IN WHT FBRGLS 1 SIDE FELT PD CNFRM LO

## (undated) DEVICE — 3M™ STERI-DRAPE™ U-DRAPE 1015: Brand: STERI-DRAPE™

## (undated) DEVICE — ROLL COT W11.5INXL11FT 1LB HIGHLY ABSRB SURG GRD

## (undated) DEVICE — C-ARM PACK: Brand: C-ARM COVER

## (undated) DEVICE — GUIDEWIRE ARTHSCP L 3.2MM DISP FOR 4.5/8.5MM BEAMING SYS

## (undated) DEVICE — GUIDEWIRE ORTH DIA1.6MM FOR MTP FUS IO FIX IOFIX +

## (undated) DEVICE — GLOVE SURG SZ 65 THK91MIL LTX FREE SYN POLYISOPRENE

## (undated) DEVICE — X-RAY TEMPLATE ONE-THIRD TUBULAR PLATE: Brand: VARIAX

## (undated) DEVICE — 3M™ IOBAN™ 2 ANTIMICROBIAL INCISE DRAPE 6648EZ: Brand: IOBAN™ 2

## (undated) DEVICE — C-ARM: Brand: UNBRANDED

## (undated) DEVICE — DRESSING,GAUZE,XEROFORM,CURAD,1"X8",ST: Brand: CURAD

## (undated) DEVICE — CARBIDE BUR,ROUND CUTTING, 10 FLUTES, MED., 5MM DIA.: Brand: MICROAIRE®

## (undated) DEVICE — SUTURE ETHLN SZ 3-0 L18IN NONABSORBABLE BLK FS-1 L24MM 3/8 663H

## (undated) DEVICE — SUTURE FIBERLOOP SZ 2-0 L24IN NONABSORBABLE BLU L26.2MM 3/8 AR723202

## (undated) DEVICE — PRECISION (9.0 X 0.51 X 25.0MM)

## (undated) DEVICE — CONTAINER SPEC 165OZ POLYPR PATH SNAP LOK CAP W/ LID

## (undated) DEVICE — COVER,TABLE,HEAVY DUTY,77"X90",STRL: Brand: MEDLINE

## (undated) DEVICE — SOLUTION IV 1000ML 0.9% SOD CHL

## (undated) DEVICE — VELCLOSE LATEX FREE ELASTIC BANDAGE D/L 6INX10YD

## (undated) DEVICE — COVER LT HNDL BLU PLAS

## (undated) DEVICE — Z CONVERTED USE 2276507 CONNECTOR TBNG POLYPR 5IN1 TOUGH SHATTERPROOF CLN FOR

## (undated) DEVICE — BANDAGE,ELASTIC,ESMARK,STERILE,6"X9',LF: Brand: MEDLINE

## (undated) DEVICE — SPLINT QUICK STEP SCOTCHCAST 5 X 30

## (undated) DEVICE — STERILE POLYISOPRENE POWDER-FREE SURGICAL GLOVES WITH EMOLLIENT COATING: Brand: PROTEXIS

## (undated) DEVICE — KIT DRL BIT DIA3MM LOC PIN TAMP K WIRE CORRESPONDING

## (undated) DEVICE — INTENDED FOR TISSUE SEPARATION, AND OTHER PROCEDURES THAT REQUIRE A SHARP SURGICAL BLADE TO PUNCTURE OR CUT.: Brand: BARD-PARKER ® CARBON RIB-BACK BLADES

## (undated) DEVICE — TOWEL,OR,DSP,ST,BLUE,DLX,8/PK,10PK/CS: Brand: MEDLINE

## (undated) DEVICE — PODIATRY: Brand: MEDLINE INDUSTRIES, INC.

## (undated) DEVICE — PADDING CAST W4INXL4YD HIGHLY ABSRB THAN COT EZ APPL

## (undated) DEVICE — GUIDEWIRE ARTHSCP SM 2MM DISP FOR 4.5/8.5MM BEAMING SYS

## (undated) DEVICE — 4.0MM EGG RESURFACING TOOL

## (undated) DEVICE — GLOVE SURG SZ 65 L12IN FNGR THK79MIL GRN LTX FREE